# Patient Record
Sex: FEMALE | Race: WHITE | NOT HISPANIC OR LATINO | Employment: PART TIME | URBAN - METROPOLITAN AREA
[De-identification: names, ages, dates, MRNs, and addresses within clinical notes are randomized per-mention and may not be internally consistent; named-entity substitution may affect disease eponyms.]

---

## 2017-02-17 ENCOUNTER — OFFICE VISIT (OUTPATIENT)
Dept: OBGYN | Facility: CLINIC | Age: 62
End: 2017-02-17
Payer: COMMERCIAL

## 2017-02-17 ENCOUNTER — RADIANT APPOINTMENT (OUTPATIENT)
Dept: MAMMOGRAPHY | Facility: CLINIC | Age: 62
End: 2017-02-17
Payer: COMMERCIAL

## 2017-02-17 VITALS
HEIGHT: 61 IN | DIASTOLIC BLOOD PRESSURE: 64 MMHG | WEIGHT: 161 LBS | SYSTOLIC BLOOD PRESSURE: 114 MMHG | BODY MASS INDEX: 30.4 KG/M2

## 2017-02-17 DIAGNOSIS — Z01.419 ENCOUNTER FOR GYNECOLOGICAL EXAMINATION WITHOUT ABNORMAL FINDING: Primary | ICD-10-CM

## 2017-02-17 DIAGNOSIS — Z12.31 VISIT FOR SCREENING MAMMOGRAM: ICD-10-CM

## 2017-02-17 PROCEDURE — 77063 BREAST TOMOSYNTHESIS BI: CPT | Mod: TC

## 2017-02-17 PROCEDURE — G0202 SCR MAMMO BI INCL CAD: HCPCS | Mod: TC

## 2017-02-17 PROCEDURE — 99396 PREV VISIT EST AGE 40-64: CPT | Performed by: OBSTETRICS & GYNECOLOGY

## 2017-02-17 ASSESSMENT — PATIENT HEALTH QUESTIONNAIRE - PHQ9: 5. POOR APPETITE OR OVEREATING: NOT AT ALL

## 2017-02-17 ASSESSMENT — ANXIETY QUESTIONNAIRES
3. WORRYING TOO MUCH ABOUT DIFFERENT THINGS: NOT AT ALL
5. BEING SO RESTLESS THAT IT IS HARD TO SIT STILL: NOT AT ALL
6. BECOMING EASILY ANNOYED OR IRRITABLE: NOT AT ALL
1. FEELING NERVOUS, ANXIOUS, OR ON EDGE: NOT AT ALL
2. NOT BEING ABLE TO STOP OR CONTROL WORRYING: NOT AT ALL
7. FEELING AFRAID AS IF SOMETHING AWFUL MIGHT HAPPEN: NOT AT ALL
IF YOU CHECKED OFF ANY PROBLEMS ON THIS QUESTIONNAIRE, HOW DIFFICULT HAVE THESE PROBLEMS MADE IT FOR YOU TO DO YOUR WORK, TAKE CARE OF THINGS AT HOME, OR GET ALONG WITH OTHER PEOPLE: NOT DIFFICULT AT ALL
GAD7 TOTAL SCORE: 0

## 2017-02-17 NOTE — PROGRESS NOTES
Marissa is a 61 year old  female who presents for annual exam.     Besides routine health maintenance, she has no other health concerns today .    HPI:  The patient's PCP is Mac Gonzalez MD  Still using lubricant for intercourse. Not using vaginal estrogen.  Has occasional USI but not a big problem.        GYNECOLOGIC HISTORY:    Patient's last menstrual period was 2009.  Her current contraception method is: menopause.  She  reports that she has never smoked. She has never used smokeless tobacco.    Patient is sexually active.  STD testing offered?  Declined  Last PHQ-9 score on record =   PHQ-9 SCORE 2017   Total Score 0     Last GAD7 score on record =   CEM-7 SCORE 2017   Total Score 0     Alcohol Score = 0    HEALTH MAINTENANCE:  Cholesterol: (  Cholesterol   Date Value Ref Range Status   2013 228 (H) 0 - 200 mg/dL Final     Comment:     LDL Cholesterol is the primary guide to therapy.   The NCEP recommends further evaluation of: patients with cholesterol greater   than 200 mg/dL if additional risk factors are present, cholesterol greater   than   240 mg/dL, triglycerides greater than 150 mg/dL, or HDL less than 40 mg/dL.   2011 243 (H) 0 - 200 mg/dL Final     Comment:     LDL Cholesterol is the primary guide to therapy.   The NCEP recommends further evaluation of: patients with cholesterol greater   than 200 mg/dL if additional risk factors are present, cholesterol greater   than   240 mg/dL, triglycerides greater than 150 mg/dL, or HDL less than 40 mg/dL.   Last Mammo: one year ago, Result: normal, Next Mammo: today  Pap: 2013 - WNL, -HPV  Colonoscopy:  2008, Result: normal, Next Colonoscopy: 2018  Dexa: Never    Health maintenance updated:  yes    HISTORY:  Obstetric History       T2      TAB1   SAB1   E0   M0   L2       # Outcome Date GA Lbr West/2nd Weight Sex Delivery Anes PTL Lv   4 Term         Y   3 Term         Y   2 SAB            1 TAB        "            Patient Active Problem List   Diagnosis     Sprain of lateral collateral ligament of knee     CARDIOVASCULAR SCREENING; LDL GOAL LESS THAN 160     Esophageal reflux     Dysphagia     Vitamin D deficiency     Vitamin B12 deficiency without anemia     Abnormal glucose     Past Surgical History   Procedure Laterality Date     C  delivery only       C repair cruciate ligament,knee       left knee, by Dr. 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 (2) Maternal Grandmother, Maternal Grandfather    HEART DISEASE (1) Father    Hypertension (2) Mother, Father            Current Outpatient Prescriptions   Medication Sig     Naproxen Sodium (ALEVE PO) Take 220 mg by mouth as needed     No current facility-administered medications for this visit.      No Known Allergies    Past medical, surgical, social and family histories were reviewed and updated in EPIC.    ROS:   12 point review of systems negative other than symptoms noted below.  Head: Nasal Congestion  Gastrointestinal: Heartburn  Genitourinary: No Periods  Musculoskeletal: Joint Pain    EXAM:  /64  Ht 5' 1\" (1.549 m)  Wt 161 lb (73 kg)  LMP 2009  Breastfeeding? No  BMI 30.42 kg/m2   BMI: Body mass index is 30.42 kg/(m^2).    PHYSICAL EXAM:  Constitutional:  Appearance: Well nourished, well developed, alert, in no acute distress  Neck:  Lymph Nodes:  No lymphadenopathy present    Thyroid:  Gland size normal, nontender, no nodules or masses present  on " palpation  Chest:  Respiratory Effort:  Breathing unlabored  Cardiovascular:    Heart: Auscultation:  Regular rate, normal rhythm, no murmurs present  Breasts: Inspection of Breasts:  No lymphadenopathy present    Palpation of Breasts and Axillae:  No masses present on palpation, no  breast tenderness    Axillary Lymph Nodes:  No lymphadenopathy present  Gastrointestinal:   Abdominal Examination:  Abdomen nontender to palpation, tone normal without rigidity or guarding, no masses present, umbilicus without lesions   Liver and Spleen:  No hepatomegaly present, liver nontender to palpation    Hernias:  No hernias present  Lymphatic: Lymph Nodes:  No other lymphadenopathy present  Skin:  General Inspection:  No rashes present, no lesions present, no areas of  discoloration    Genitalia and Groin:  No rashes present, no lesions present, no areas of  discoloration, no masses present  Neurologic/Psychiatric:    Mental Status:  Oriented X3     Pelvic Exam:  External Genitalia:     Normal appearance for age, no discharge present, no tenderness present, no inflammatory lesions present, color normal  Vagina:     Normal vaginal vault without central or paravaginal defects, no discharge present, no inflammatory lesions present, no masses present  Bladder:     Nontender to palpation  Urethra:   Urethral Body:  Urethra palpation normal, urethra structural support normal   Urethral Meatus:  No erythema or lesions present  Cervix:     Appearance healthy, no lesions present, nontender to palpation, no bleeding present  Uterus:     Nontender to palpation, no masses present, position anteflexed, mobility: normal  Adnexa:     No adnexal tenderness present, no adnexal masses present  Perineum:     Perineum within normal limits, no evidence of trauma, no rashes or skin lesions present  Anus:     Anus within normal limits, no hemorrhoids present  Inguinal Lymph Nodes:     No lymphadenopathy present  Pubic Hair:     Normal pubic hair  distribution for age  Genitalia and Groin:     No rashes present, no lesions present, no areas of discoloration, no masses present    COUNSELING:   Reviewed preventive health counseling, as reflected in patient instructions       Regular exercise    BMI: Body mass index is 30.42 kg/(m^2).  Weight management plan: Patient was referred to their PCP to discuss a diet and exercise plan.    ASSESSMENT:  61 year old female with satisfactory annual exam.    ICD-10-CM    1. Encounter for gynecological examination without abnormal finding Z01.419        PLAN:  Mammogram today. RTC 1 year.    Jamari Oh MD

## 2017-02-17 NOTE — MR AVS SNAPSHOT
"              After Visit Summary   2/17/2017    Marissa Kaur    MRN: [id number]           Patient Information     Date Of Birth          1955        Visit Information        Provider Department      2/17/2017 2:30 PM Jamari Oh MD [facility]        Today's Diagnoses     Encounter for gynecological examination without abnormal finding    -  1       Follow-ups after your visit        Who to contact     If you have questions or need follow up information about today's clinic visit or your schedule please contact [facility] directly at [phone].  Normal or non-critical lab and imaging results will be communicated to you by L[employer].frhart, letter or phone within 4 business days after the clinic has received the results. If you do not hear from us within 7 days, please contact the clinic through [employer] or phone. If you have a critical or abnormal lab result, we will notify you by phone as soon as possible.  Submit refill requests through [employer] or call your pharmacy and they will forward the refill request to us. Please allow 3 business days for your refill to be completed.          Additional Information About Your Visit        MyChart Information     [employer] gives you secure access to your electronic health record. If you see a primary care provider, you can also send messages to your care team and make appointments. If you have questions, please call your primary care clinic.  If you do not have a primary care provider, please call [phone] and they will assist you.        Care EveryWhere ID     This is your Care EveryWhere ID. This could be used by other organizations to access your [facility] medical records  [id number]        Your Vitals Were     Height Last Period Breastfeeding? BMI (Body Mass Index)          5' 1\" (1.549 m) 11/02/2009 No 30.42 kg/m2         Blood Pressure from Last 3 Encounters:   02/17/17 114/64   03/21/16 140/78   02/09/16 " 128/80    Weight from Last 3 Encounters:   02/17/17 161 lb (73 kg)   03/21/16 160 lb (72.6 kg)   02/09/16 159 lb (72.1 kg)              Today, you had the following     No orders found for display       Primary Care Provider Office Phone # Fax #    Mac Gonzalez [phone]       [facility] [address] [facility] [address]        Thank you!     Thank you for choosing [facility] [place] [place]  for your care. Our goal is always to provide you with excellent care. Hearing back from our patients is one way we can continue to improve our services. Please take a few minutes to complete the written survey that you may receive in the mail after your visit with us. Thank you!             Your Updated Medication List - Protect others around you: Learn how to safely use, store and throw away your medicines at www.disposemymeds.org.          This list is accurate as of: 2/17/17  4:02 PM.  Always use your most recent med list.                   Brand Name Dispense Instructions for use    ALEVE PO      Take 220 mg by mouth as needed

## 2017-02-18 ASSESSMENT — PATIENT HEALTH QUESTIONNAIRE - PHQ9: SUM OF ALL RESPONSES TO PHQ QUESTIONS 1-9: 0

## 2017-02-18 ASSESSMENT — ANXIETY QUESTIONNAIRES: GAD7 TOTAL SCORE: 0

## 2017-12-21 ENCOUNTER — TELEPHONE (OUTPATIENT)
Dept: INTERNAL MEDICINE | Facility: CLINIC | Age: 62
End: 2017-12-21

## 2018-02-20 ENCOUNTER — OFFICE VISIT (OUTPATIENT)
Dept: OBGYN | Facility: CLINIC | Age: 63
End: 2018-02-20
Attending: OBSTETRICS & GYNECOLOGY
Payer: COMMERCIAL

## 2018-02-20 ENCOUNTER — HOSPITAL ENCOUNTER (OUTPATIENT)
Dept: MAMMOGRAPHY | Facility: CLINIC | Age: 63
Discharge: HOME | End: 2018-02-20
Attending: OBSTETRICS & GYNECOLOGY | Admitting: OBSTETRICS & GYNECOLOGY
Payer: COMMERCIAL

## 2018-02-20 VITALS
HEIGHT: 62 IN | WEIGHT: 169 LBS | DIASTOLIC BLOOD PRESSURE: 76 MMHG | SYSTOLIC BLOOD PRESSURE: 118 MMHG | HEART RATE: 72 BPM | BODY MASS INDEX: 31.1 KG/M2

## 2018-02-20 DIAGNOSIS — Z01.419 ENCOUNTER FOR GYNECOLOGICAL EXAMINATION WITHOUT ABNORMAL FINDING: Primary | ICD-10-CM

## 2018-02-20 DIAGNOSIS — Z12.11 SCREEN FOR COLON CANCER: ICD-10-CM

## 2018-02-20 DIAGNOSIS — E78.00 PURE HYPERCHOLESTEROLEMIA: ICD-10-CM

## 2018-02-20 DIAGNOSIS — Z23 NEED FOR TDAP VACCINATION: ICD-10-CM

## 2018-02-20 DIAGNOSIS — Z13.228 SCREENING FOR METABOLIC DISORDER: ICD-10-CM

## 2018-02-20 DIAGNOSIS — Z12.31 VISIT FOR SCREENING MAMMOGRAM: ICD-10-CM

## 2018-02-20 PROCEDURE — G0145 SCR C/V CYTO,THINLAYER,RESCR: HCPCS | Performed by: OBSTETRICS & GYNECOLOGY

## 2018-02-20 PROCEDURE — 77067 SCR MAMMO BI INCL CAD: CPT

## 2018-02-20 PROCEDURE — 90715 TDAP VACCINE 7 YRS/> IM: CPT | Performed by: OBSTETRICS & GYNECOLOGY

## 2018-02-20 PROCEDURE — 90471 IMMUNIZATION ADMIN: CPT | Performed by: OBSTETRICS & GYNECOLOGY

## 2018-02-20 PROCEDURE — 99396 PREV VISIT EST AGE 40-64: CPT | Performed by: OBSTETRICS & GYNECOLOGY

## 2018-02-20 PROCEDURE — 87624 HPV HI-RISK TYP POOLED RSLT: CPT | Performed by: OBSTETRICS & GYNECOLOGY

## 2018-02-20 ASSESSMENT — ANXIETY QUESTIONNAIRES
2. NOT BEING ABLE TO STOP OR CONTROL WORRYING: NOT AT ALL
7. FEELING AFRAID AS IF SOMETHING AWFUL MIGHT HAPPEN: NOT AT ALL
GAD7 TOTAL SCORE: 0
5. BEING SO RESTLESS THAT IT IS HARD TO SIT STILL: NOT AT ALL
6. BECOMING EASILY ANNOYED OR IRRITABLE: NOT AT ALL
1. FEELING NERVOUS, ANXIOUS, OR ON EDGE: NOT AT ALL
3. WORRYING TOO MUCH ABOUT DIFFERENT THINGS: NOT AT ALL

## 2018-02-20 ASSESSMENT — PATIENT HEALTH QUESTIONNAIRE - PHQ9: 5. POOR APPETITE OR OVEREATING: NOT AT ALL

## 2018-02-20 NOTE — LETTER
February 28, 2018    Marissa Kaur  [address] [address] [facility] [address]    Dear Marissa,  We are happy to inform you that your PAP smear result from 2/20/18 is normal.  We are now able to do a follow up test on PAP smears. The DNA test is for HPV (Human Papilloma Virus). Cervical cancer is closely linked with certain types of HPV. Your result showed no evidence of high risk HPV.  Therefore we recommend you return in 3 years for your next pap smear.  You will still need to return to the clinic every year for an annual exam and other preventive tests.  Please contact the clinic at [phone] with any questions.  Sincerely,    Jamari Oh MD/karen

## 2018-02-20 NOTE — PROGRESS NOTES
Marissa is a 62 year old  female who presents for annual exam.     Besides routine health maintenance, she has no other health concerns today .    HPI:  The patient's PCP is  Mca Gonzalez MD.(Doesn't see often)  No big issues.  Uses lubricant prn.  Bladder good.            GYNECOLOGIC HISTORY:    Patient's last menstrual period was 2009.  Her current contraception method is: menopause.  She  reports that she has never smoked. She has never used smokeless tobacco.    Patient is sexually active.  STD testing offered?  Declined  Last PHQ-9 score on record =   PHQ-9 SCORE 2018   Total Score 0     Last GAD7 score on record =   CEM-7 SCORE 2018   Total Score 0     Alcohol Score = 0    HEALTH MAINTENANCE:  Cholesterol: (  Cholesterol   Date Value Ref Range Status   2013 228 (H) 0 - 200 mg/dL Final     Comment:     LDL Cholesterol is the primary guide to therapy.   The NCEP recommends further evaluation of: patients with cholesterol greater   than 200 mg/dL if additional risk factors are present, cholesterol greater   than   240 mg/dL, triglycerides greater than 150 mg/dL, or HDL less than 40 mg/dL.   2011 243 (H) 0 - 200 mg/dL Final     Comment:     LDL Cholesterol is the primary guide to therapy.   The NCEP recommends further evaluation of: patients with cholesterol greater   than 200 mg/dL if additional risk factors are present, cholesterol greater   than   240 mg/dL, triglycerides greater than 150 mg/dL, or HDL less than 40 mg/dL.     Last Mammo: one year ago, Result: normal, Next Mammo: today   Pap:13 NIL HPV-  Colonoscopy:  Age 52, Result: normal, Next Colonoscopy: needs to schedule  Dexa:  never    Health maintenance updated:  yes    HISTORY:  Obstetric History       T2      L2     SAB1   TAB1   Ectopic0   Multiple0   Live Births2       # Outcome Date GA Lbr West/2nd Weight Sex Delivery Anes PTL Lv   4 Term         CHANEL   3 Term         CHANEL   2 SAB           "  1 TAB                   Patient Active Problem List   Diagnosis     Sprain of lateral collateral ligament of knee     CARDIOVASCULAR SCREENING; LDL GOAL LESS THAN 160     Esophageal reflux     Dysphagia     Vitamin D deficiency     Vitamin B12 deficiency without anemia     Abnormal glucose     Past Surgical History:   Procedure Laterality Date     C  DELIVERY ONLY       C REPAIR CRUCIATE LIGAMENT,KNEE      left knee, by Dr. 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 (2) Maternal Grandmother, Maternal Grandfather    HEART DISEASE (1) Father    Hypertension (2) Mother, Father    Pancreatic Cancer (1) Mother            Current Outpatient Prescriptions   Medication Sig     Naproxen Sodium (ALEVE PO) Take 220 mg by mouth as needed     No current facility-administered medications for this visit.      No Known Allergies    Past medical, surgical, social and family histories were reviewed and updated in EPIC.    ROS:   12 point review of systems negative other than symptoms noted below.  Head: Nasal Congestion  Gastrointestinal: Heartburn  Genitourinary: Vaginal Dryness    EXAM:  /76  Pulse 72  Ht 5' 2\" (1.575 m)  Wt 169 lb (76.7 kg)  LMP 2009  BMI 30.91 kg/m2   BMI: Body mass index is 30.91 kg/(m^2).    PHYSICAL EXAM:  Constitutional:  Appearance: Well nourished, well developed, alert, in no acute distress  Neck:  Lymph Nodes:  No lymphadenopathy present    Thyroid:  Gland size normal, nontender, no nodules or masses " present  on palpation  Chest:  Respiratory Effort:  Breathing unlabored  Cardiovascular:    Heart: Auscultation:  Regular rate, normal rhythm, no murmurs present  Breasts: Inspection of Breasts:  No lymphadenopathy present., Palpation of Breasts and Axillae:  No masses present on palpation, no breast tenderness., Axillary Lymph Nodes:  No lymphadenopathy present. and No nodularity, asymmetry or nipple discharge bilaterally.  Gastrointestinal:   Abdominal Examination:  Abdomen nontender to palpation, tone normal without rigidity or guarding, no masses present, umbilicus without lesions   Liver and Spleen:  No hepatomegaly present, liver nontender to palpation    Hernias:  No hernias present  Lymphatic: Lymph Nodes:  No other lymphadenopathy present  Skin:  General Inspection:  No rashes present, no lesions present, no areas of  discoloration    Genitalia and Groin:  No rashes present, no lesions present, no areas of  discoloration, no masses present  Neurologic/Psychiatric:    Mental Status:  Oriented X3     Pelvic Exam:  External Genitalia:     Normal appearance for age, no discharge present, no tenderness present, no inflammatory lesions present, color normal  Vagina:     Normal vaginal vault without central or paravaginal defects, ATROPHIC  Bladder:     Nontender to palpation  Urethra:   Urethral Body:  Urethra palpation normal, urethra structural support normal   Urethral Meatus:  No erythema or lesions present  Cervix:     Appearance healthy, no lesions present, nontender to palpation, no bleeding present  Uterus:     Nontender to palpation, no masses present, position anteflexed, mobility: normal  Adnexa:     No adnexal tenderness present, no adnexal masses present  Perineum:     Perineum within normal limits, no evidence of trauma, no rashes or skin lesions present  Inguinal Lymph Nodes:     No lymphadenopathy present      COUNSELING:   Reviewed preventive health counseling, as reflected in patient  instructions       Regular exercise    BMI: Body mass index is 30.91 kg/(m^2).  Weight management plan: Encouraged diet and exercise    ASSESSMENT:  62 year old female with satisfactory annual exam.    ICD-10-CM    1. Encounter for gynecological examination without abnormal finding Z01.419 Pap imaged thin layer screen with HPV - recommended age 30 - 65     HPV High Risk Types DNA Cervical   2. Screen for colon cancer Z12.11 GASTROENTEROLOGY ADULT REF PROCEDURE ONLY Jessica Romo [phone]; (has done through [facility] in the past)   3. Pure hypercholesterolemia E78.00 Lipid Profile   4. Screening for metabolic disorder Z13.228 **Comprehensive metabolic panel FUTURE 6mo       PLAN:  Due for Tdap and Flu shot today.  Will RTC at local clinic for fasting labs.      Jamari Oh MD

## 2018-02-20 NOTE — MR AVS SNAPSHOT
After Visit Summary   2/20/2018    Marissa Kaur    MRN: [id number]           Patient Information     Date Of Birth          1955        Visit Information        Provider Department      2/20/2018 2:00 PM Jamari Oh MD [facility]        Today's Diagnoses     Encounter for gynecological examination without abnormal finding    -  1    Screen for colon cancer        Pure hypercholesterolemia        Screening for metabolic disorder           Follow-ups after your visit        Additional Services     GASTROENTEROLOGY ADULT REF PROCEDURE ONLY Jessica Romo [phone]; (has done through [facility] in the past)       Last Lab Result: Creatinine (mg/dL)       Date                     Value                 12/30/2015               0.85             ----------  Body mass index is 30.91 kg/(m^2).     Needed:  No  Language:  English    Patient will be contacted to schedule procedure.     Please be aware that coverage of these services is subject to the terms and limitations of your health insurance plan.  Call member services at your health plan with any benefit or coverage questions.  Any procedures must be performed at a [facility] facility OR coordinated by your clinic's referral office.    Please bring the following with you to your appointment:    (1) Any X-Rays, CTs or MRIs which have been performed.  Contact the facility where they were done to arrange for  prior to your scheduled appointment.    (2) List of current medications   (3) This referral request   (4) Any documents/labs given to you for this referral                  Future tests that were ordered for you today     Open Future Orders        Priority Expected Expires Ordered    **Comprehensive metabolic panel FUTURE 6mo Routine 8/19/2018 9/18/2018 2/20/2018    Lipid Profile Routine  2/20/2019 2/20/2018            Who to contact     If you have questions or need follow up information  "about today's clinic visit or your schedule please contact [facility] directly at [phone].  Normal or non-critical lab and imaging results will be communicated to you by JEDI MINDhart, letter or phone within 4 business days after the clinic has received the results. If you do not hear from us within 7 days, please contact the clinic through [employer] or phone. If you have a critical or abnormal lab result, we will notify you by phone as soon as possible.  Submit refill requests through [employer] or call your pharmacy and they will forward the refill request to us. Please allow 3 business days for your refill to be completed.          Additional Information About Your Visit        [employer] Information     [employer] gives you secure access to your electronic health record. If you see a primary care provider, you can also send messages to your care team and make appointments. If you have questions, please call your primary care clinic.  If you do not have a primary care provider, please call [phone] and they will assist you.        Care EveryWhere ID     This is your Care EveryWhere ID. This could be used by other organizations to access your [facility] medical records  [id number]        Your Vitals Were     Pulse Height Last Period BMI (Body Mass Index)          72 5' 2\" (1.575 m) 11/02/2009 30.91 kg/m2         Blood Pressure from Last 3 Encounters:   02/20/18 118/76   02/17/17 114/64   03/21/16 140/78    Weight from Last 3 Encounters:   02/20/18 169 lb (76.7 kg)   02/17/17 161 lb (73 kg)   03/21/16 160 lb (72.6 kg)              We Performed the Following     GASTROENTEROLOGY ADULT REF PROCEDURE ONLY Jessica Romo [phone]; (has done through [facility] in the past)     HPV High Risk Types DNA Cervical     Pap imaged thin layer screen with HPV - recommended age 30 - 65        Primary Care Provider Office Phone # Fax #    Mac Gonzalez [phone]       XXX RESIGNED " XXX  [facility] [address]        Equal Access to Services     ERIC ARTIS : Hadii aad ku hadjamildebra Rajeevali, wapetrada willyadaha, qaviralta niamaleatha riveramattierupali taylor. So [facility] [phone].    ATENCIÓN: Si habla español, tiene a kulkarni disposición servicios gratuitos de asistencia lingüística. Llame al [phone].    We comply with applicable federal civil rights laws and [place] laws. We do not discriminate on the basis of race, color, national origin, age, disability, sex, sexual orientation, or gender identity.            Thank you!     Thank you for choosing [facility]  for your care. Our goal is always to provide you with excellent care. Hearing back from our patients is one way we can continue to improve our services. Please take a few minutes to complete the written survey that you may receive in the mail after your visit with us. Thank you!             Your Updated Medication List - Protect others around you: Learn how to safely use, store and throw away your medicines at www.disposemymeds.org.          This list is accurate as of 2/20/18  2:38 PM.  Always use your most recent med list.                   Brand Name Dispense Instructions for use Diagnosis    ALEVE PO      Take 220 mg by mouth as needed

## 2018-02-21 ASSESSMENT — ANXIETY QUESTIONNAIRES: GAD7 TOTAL SCORE: 0

## 2018-02-21 ASSESSMENT — PATIENT HEALTH QUESTIONNAIRE - PHQ9: SUM OF ALL RESPONSES TO PHQ QUESTIONS 1-9: 0

## 2018-02-23 LAB
COPATH REPORT: NORMAL
PAP: NORMAL

## 2018-02-27 LAB
FINAL DIAGNOSIS: NORMAL
HPV HR 12 DNA CVX QL NAA+PROBE: NEGATIVE
HPV16 DNA SPEC QL NAA+PROBE: NEGATIVE
HPV18 DNA SPEC QL NAA+PROBE: NEGATIVE
SPECIMEN DESCRIPTION: NORMAL
SPECIMEN SOURCE CVX/VAG CYTO: NORMAL

## 2018-10-10 ENCOUNTER — TELEPHONE (OUTPATIENT)
Dept: OBGYN | Facility: CLINIC | Age: 63
End: 2018-10-10

## 2019-03-01 ENCOUNTER — OFFICE VISIT (OUTPATIENT)
Dept: OBGYN | Facility: CLINIC | Age: 64
End: 2019-03-01
Payer: COMMERCIAL

## 2019-03-01 ENCOUNTER — ANCILLARY PROCEDURE (OUTPATIENT)
Dept: MAMMOGRAPHY | Facility: CLINIC | Age: 64
End: 2019-03-01
Payer: COMMERCIAL

## 2019-03-01 VITALS
HEIGHT: 61 IN | SYSTOLIC BLOOD PRESSURE: 122 MMHG | BODY MASS INDEX: 31.34 KG/M2 | WEIGHT: 166 LBS | DIASTOLIC BLOOD PRESSURE: 70 MMHG

## 2019-03-01 DIAGNOSIS — Z13.220 ENCOUNTER FOR LIPID SCREENING FOR CARDIOVASCULAR DISEASE: ICD-10-CM

## 2019-03-01 DIAGNOSIS — Z13.6 ENCOUNTER FOR LIPID SCREENING FOR CARDIOVASCULAR DISEASE: ICD-10-CM

## 2019-03-01 DIAGNOSIS — Z12.31 VISIT FOR SCREENING MAMMOGRAM: ICD-10-CM

## 2019-03-01 DIAGNOSIS — Z12.11 SCREEN FOR COLON CANCER: ICD-10-CM

## 2019-03-01 DIAGNOSIS — Z01.419 ENCOUNTER FOR GYNECOLOGICAL EXAMINATION WITHOUT ABNORMAL FINDING: Primary | ICD-10-CM

## 2019-03-01 DIAGNOSIS — Z13.228 SCREENING FOR METABOLIC DISORDER: ICD-10-CM

## 2019-03-01 PROCEDURE — 77067 SCR MAMMO BI INCL CAD: CPT | Mod: TC

## 2019-03-01 PROCEDURE — 99396 PREV VISIT EST AGE 40-64: CPT | Performed by: OBSTETRICS & GYNECOLOGY

## 2019-03-01 PROCEDURE — 77063 BREAST TOMOSYNTHESIS BI: CPT | Mod: TC

## 2019-03-01 ASSESSMENT — MIFFLIN-ST. JEOR: SCORE: 1255.35

## 2019-03-01 ASSESSMENT — ANXIETY QUESTIONNAIRES
7. FEELING AFRAID AS IF SOMETHING AWFUL MIGHT HAPPEN: NOT AT ALL
GAD7 TOTAL SCORE: 0
6. BECOMING EASILY ANNOYED OR IRRITABLE: NOT AT ALL
3. WORRYING TOO MUCH ABOUT DIFFERENT THINGS: NOT AT ALL
IF YOU CHECKED OFF ANY PROBLEMS ON THIS QUESTIONNAIRE, HOW DIFFICULT HAVE THESE PROBLEMS MADE IT FOR YOU TO DO YOUR WORK, TAKE CARE OF THINGS AT HOME, OR GET ALONG WITH OTHER PEOPLE: NOT DIFFICULT AT ALL
5. BEING SO RESTLESS THAT IT IS HARD TO SIT STILL: NOT AT ALL
1. FEELING NERVOUS, ANXIOUS, OR ON EDGE: NOT AT ALL
2. NOT BEING ABLE TO STOP OR CONTROL WORRYING: NOT AT ALL

## 2019-03-01 ASSESSMENT — PATIENT HEALTH QUESTIONNAIRE - PHQ9
5. POOR APPETITE OR OVEREATING: NOT AT ALL
SUM OF ALL RESPONSES TO PHQ QUESTIONS 1-9: 0

## 2019-03-01 NOTE — PROGRESS NOTES
Marissa is a 63 year old  female who presents for annual exam.     Besides routine health maintenance, she has no other health concerns today .    HPI:    No concerns. Feels good. Needs fasting labs.   Overdue for colonoscopy  UTD with Tdap      GYNECOLOGIC HISTORY:    Patient's last menstrual period was 2009.  She  reports that  has never smoked. she has never used smokeless tobacco.    Patient is sexually active.  STD testing offered?  Declined     Last PHQ-9 score on record =   PHQ-9 SCORE 3/1/2019   PHQ-9 Total Score 0     Last GAD7 score on record =   CEM-7 SCORE 3/1/2019   Total Score 0     Alcohol Score = 0    HEALTH MAINTENANCE:  Cholesterol: future lab orders placed  13   Total= 228, Triglycerides=62, HDL=71, BKZ=358, FBS=93  Cholesterol   Date Value Ref Range Status   2013 228 (H) 0 - 200 mg/dL Final     Comment:     LDL Cholesterol is the primary guide to therapy.   The NCEP recommends further evaluation of: patients with cholesterol greater   than 200 mg/dL if additional risk factors are present, cholesterol greater   than   240 mg/dL, triglycerides greater than 150 mg/dL, or HDL less than 40 mg/dL.   2011 243 (H) 0 - 200 mg/dL Final     Comment:     LDL Cholesterol is the primary guide to therapy.   The NCEP recommends further evaluation of: patients with cholesterol greater   than 200 mg/dL if additional risk factors are present, cholesterol greater   than   240 mg/dL, triglycerides greater than 150 mg/dL, or HDL less than 40 mg/dL.   Last Mammo: 18, Result: normal, Next Mammo: today   Pap:   Lab Results   Component Value Date    PAP NIL, NEG-HPV 2018   Colonoscopy:  Referred to [facility]  Dexa:  never  Health maintenance updated:  yes    HISTORY:  Obstetric History       T2      L2     SAB1   TAB1   Ectopic0   Multiple0   Live Births2       # Outcome Date GA Lbr West/2nd Weight Sex Delivery Anes PTL Lv   4 Term         CHANEL   3 Term         HCANEL  "  2 SAB            1 TAB                   Patient Active Problem List   Diagnosis     Sprain of lateral collateral ligament of knee     CARDIOVASCULAR SCREENING; LDL GOAL LESS THAN 160     Esophageal reflux     Dysphagia     Vitamin D deficiency     Vitamin B12 deficiency without anemia     Abnormal glucose     Past Surgical History:   Procedure Laterality Date     C  DELIVERY ONLY       C REPAIR CRUCIATE LIGAMENT,KNEE      left knee, by Dr. 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 (2) Maternal Grandmother, Maternal Grandfather    Heart Disease (1) Father    Hypertension (2) Mother, Father    Pancreatic Cancer (1) Mother            Current Outpatient Medications   Medication Sig     Cholecalciferol (VITAMIN D3 PO) Take by mouth daily     Cyanocobalamin (VITAMIN B 12 PO)      Digestive Enzymes (DIGESTIVE ENZYME PO)      Multiple Vitamin (MULTIVITAMINS PO)      Naproxen Sodium (ALEVE PO) Take 220 mg by mouth as needed     Probiotic Product (PROBIOTIC PO)      No current facility-administered medications for this visit.      No Known Allergies    Past medical, surgical, social and family histories were reviewed and updated in EPIC.    ROS:   12 point review of systems negative other than symptoms noted below.  Head: Nasal Congestion    EXAM:  /70   Ht 1.549 m (5' 1\")   Wt 75.3 kg (166 lb)   LMP 2009   BMI 31.37 kg/m     BMI: Body mass index is 31.37 kg/m .    PHYSICAL " EXAM:  Constitutional:  Appearance: Well nourished, well developed, alert, in no acute distress  Neck:  Lymph Nodes:  No lymphadenopathy present    Thyroid:  Gland size normal, nontender, no nodules or masses present  on palpation  Chest:  Respiratory Effort:  Breathing unlabored  Cardiovascular:    Heart: Auscultation:  Regular rate, normal rhythm, no murmurs present  Breasts: Inspection of Breasts:  No lymphadenopathy present., Palpation of Breasts and Axillae:  No masses present on palpation, no breast tenderness., Axillary Lymph Nodes:  No lymphadenopathy present. and No nodularity, asymmetry or nipple discharge bilaterally.  Gastrointestinal:   Abdominal Examination:  Abdomen nontender to palpation, tone normal without rigidity or guarding, no masses present, umbilicus without lesions   Liver and Spleen:  No hepatomegaly present, liver nontender to palpation    Hernias:  No hernias present  Lymphatic: Lymph Nodes:  No other lymphadenopathy present  Skin:  General Inspection:  No rashes present, no lesions present, no areas of  discoloration    Genitalia and Groin:  No rashes present, no lesions present, no areas of  discoloration, no masses present  Neurologic/Psychiatric:    Mental Status:  Oriented X3     Pelvic Exam:  External Genitalia:     Normal appearance for age, no discharge present, no tenderness present, no inflammatory lesions present, color normal  Vagina:     Normal vaginal vault without central or paravaginal defects, no discharge present, no inflammatory lesions present, no masses present  Bladder:     Nontender to palpation  Urethra:   Urethral Body:  Urethra palpation normal, urethra structural support normal   Urethral Meatus:  No erythema or lesions present  Cervix:     Appearance healthy, no lesions present, nontender to palpation, no bleeding present  Uterus:     Uterus: firm, normal sized and nontender, anteverted in position.   Adnexa:     No adnexal tenderness present, no adnexal masses  present  Perineum:     Perineum within normal limits, no evidence of trauma, no rashes or skin lesions present  Anus:     Anus within normal limits, no hemorrhoids present  Inguinal Lymph Nodes:     No lymphadenopathy present  Pubic Hair:     Normal pubic hair distribution for age  Genitalia and Groin:     No rashes present, no lesions present, no areas of discoloration, no masses present      COUNSELING:   Reviewed preventive health counseling, as reflected in patient instructions       Regular exercise    BMI: Body mass index is 31.37 kg/m .  Weight management plan: Encouraged weight loss    ASSESSMENT:  63 year old female with satisfactory annual exam.    ICD-10-CM    1. Encounter for gynecological examination without abnormal finding Z01.419        PLAN:  Will RTC for fasting labs and possibly ShingRix vaccine    Jamari Oh MD

## 2019-03-02 ASSESSMENT — ANXIETY QUESTIONNAIRES: GAD7 TOTAL SCORE: 0

## 2019-05-15 ENCOUNTER — TELEPHONE (OUTPATIENT)
Dept: OBGYN | Facility: CLINIC | Age: 64
End: 2019-05-15

## 2019-05-15 NOTE — TELEPHONE ENCOUNTER
1st attempt. Called patient regarding overdue labs. No answer left vm message for patient to schedule a lab only appt. Labs extended 3 months.

## 2019-06-08 ENCOUNTER — APPOINTMENT (OUTPATIENT)
Dept: CT IMAGING | Facility: CLINIC | Age: 64
End: 2019-06-08
Attending: PHYSICIAN ASSISTANT
Payer: COMMERCIAL

## 2019-06-08 ENCOUNTER — HOSPITAL ENCOUNTER (EMERGENCY)
Facility: CLINIC | Age: 64
Discharge: HOME | End: 2019-06-08
Attending: NURSE PRACTITIONER | Admitting: NURSE PRACTITIONER
Payer: COMMERCIAL

## 2019-06-08 VITALS
OXYGEN SATURATION: 95 % | HEART RATE: 84 BPM | SYSTOLIC BLOOD PRESSURE: 136 MMHG | WEIGHT: 160 LBS | TEMPERATURE: 97.9 F | DIASTOLIC BLOOD PRESSURE: 77 MMHG | BODY MASS INDEX: 30.23 KG/M2 | RESPIRATION RATE: 16 BRPM

## 2019-06-08 DIAGNOSIS — R07.9 ACUTE CHEST PAIN: ICD-10-CM

## 2019-06-08 LAB
ALBUMIN SERPL-MCNC: 3.5 G/DL (ref 3.4–5)
ALP SERPL-CCNC: 159 U/L (ref 40–150)
ALT SERPL W P-5'-P-CCNC: 31 U/L (ref 0–50)
ANION GAP SERPL CALCULATED.3IONS-SCNC: 6 MMOL/L (ref 3–14)
AST SERPL W P-5'-P-CCNC: 30 U/L (ref 0–45)
BASOPHILS # BLD AUTO: 0 10E9/L (ref 0–0.2)
BASOPHILS NFR BLD AUTO: 0.5 %
BILIRUB SERPL-MCNC: 0.2 MG/DL (ref 0.2–1.3)
BUN SERPL-MCNC: 22 MG/DL (ref 7–30)
CALCIUM SERPL-MCNC: 8.8 MG/DL (ref 8.5–10.1)
CHLORIDE SERPL-SCNC: 109 MMOL/L (ref 94–109)
CO2 SERPL-SCNC: 27 MMOL/L (ref 20–32)
CREAT SERPL-MCNC: 0.88 MG/DL (ref 0.52–1.04)
D DIMER PPP FEU-MCNC: 0.7 UG/ML FEU (ref 0–0.5)
DIFFERENTIAL METHOD BLD: NORMAL
EOSINOPHIL # BLD AUTO: 0.1 10E9/L (ref 0–0.7)
EOSINOPHIL NFR BLD AUTO: 1 %
ERYTHROCYTE [DISTWIDTH] IN BLOOD BY AUTOMATED COUNT: 13.2 % (ref 10–15)
GFR SERPL CREATININE-BSD FRML MDRD: 71 ML/MIN/{1.73_M2}
GLUCOSE SERPL-MCNC: 121 MG/DL (ref 70–99)
HCT VFR BLD AUTO: 41.4 % (ref 35–47)
HGB BLD-MCNC: 13.7 G/DL (ref 11.7–15.7)
IMM GRANULOCYTES # BLD: 0 10E9/L (ref 0–0.4)
IMM GRANULOCYTES NFR BLD: 0.4 %
LIPASE SERPL-CCNC: 134 U/L (ref 73–393)
LYMPHOCYTES # BLD AUTO: 2.2 10E9/L (ref 0.8–5.3)
LYMPHOCYTES NFR BLD AUTO: 26 %
MCH RBC QN AUTO: 30.2 PG (ref 26.5–33)
MCHC RBC AUTO-ENTMCNC: 33.1 G/DL (ref 31.5–36.5)
MCV RBC AUTO: 91 FL (ref 78–100)
MONOCYTES # BLD AUTO: 0.7 10E9/L (ref 0–1.3)
MONOCYTES NFR BLD AUTO: 8 %
NEUTROPHILS # BLD AUTO: 5.4 10E9/L (ref 1.6–8.3)
NEUTROPHILS NFR BLD AUTO: 64.1 %
NRBC # BLD AUTO: 0 10*3/UL
NRBC BLD AUTO-RTO: 0 /100
PLATELET # BLD AUTO: 276 10E9/L (ref 150–450)
POTASSIUM SERPL-SCNC: 3.8 MMOL/L (ref 3.4–5.3)
PROT SERPL-MCNC: 7.2 G/DL (ref 6.8–8.8)
RBC # BLD AUTO: 4.54 10E12/L (ref 3.8–5.2)
SODIUM SERPL-SCNC: 142 MMOL/L (ref 133–144)
TROPONIN I SERPL-MCNC: <0.015 UG/L (ref 0–0.04)
TROPONIN I SERPL-MCNC: <0.015 UG/L (ref 0–0.04)
TSH SERPL DL<=0.005 MIU/L-ACNC: 2.1 MU/L (ref 0.4–4)
WBC # BLD AUTO: 8.4 10E9/L (ref 4–11)

## 2019-06-08 PROCEDURE — 25000132 ZZH RX MED GY IP 250 OP 250 PS 637: Performed by: PHYSICIAN ASSISTANT

## 2019-06-08 PROCEDURE — 83690 ASSAY OF LIPASE: CPT | Performed by: PHYSICIAN ASSISTANT

## 2019-06-08 PROCEDURE — 84443 ASSAY THYROID STIM HORMONE: CPT | Performed by: PHYSICIAN ASSISTANT

## 2019-06-08 PROCEDURE — 80053 COMPREHEN METABOLIC PANEL: CPT | Performed by: PHYSICIAN ASSISTANT

## 2019-06-08 PROCEDURE — 99285 EMERGENCY DEPT VISIT HI MDM: CPT | Mod: 25

## 2019-06-08 PROCEDURE — 71260 CT THORAX DX C+: CPT

## 2019-06-08 PROCEDURE — 93005 ELECTROCARDIOGRAM TRACING: CPT

## 2019-06-08 PROCEDURE — 85379 FIBRIN DEGRADATION QUANT: CPT | Performed by: PHYSICIAN ASSISTANT

## 2019-06-08 PROCEDURE — 84484 ASSAY OF TROPONIN QUANT: CPT | Performed by: PHYSICIAN ASSISTANT

## 2019-06-08 PROCEDURE — 85025 COMPLETE CBC W/AUTO DIFF WBC: CPT | Performed by: PHYSICIAN ASSISTANT

## 2019-06-08 PROCEDURE — 25000128 H RX IP 250 OP 636: Performed by: PHYSICIAN ASSISTANT

## 2019-06-08 RX ORDER — ASPIRIN 325 MG
325 TABLET ORAL ONCE
Status: COMPLETED | OUTPATIENT
Start: 2019-06-08 | End: 2019-06-08

## 2019-06-08 RX ORDER — IOPAMIDOL 755 MG/ML
500 INJECTION, SOLUTION INTRAVASCULAR ONCE
Status: COMPLETED | OUTPATIENT
Start: 2019-06-08 | End: 2019-06-08

## 2019-06-08 RX ADMIN — ASPIRIN 325 MG ORAL TABLET 325 MG: 325 PILL ORAL at 18:13

## 2019-06-08 RX ADMIN — SODIUM CHLORIDE 78 ML: 9 INJECTION, SOLUTION INTRAVENOUS at 19:08

## 2019-06-08 RX ADMIN — IOPAMIDOL 63 ML: 755 INJECTION, SOLUTION INTRAVENOUS at 19:08

## 2019-06-08 ASSESSMENT — ENCOUNTER SYMPTOMS
VOMITING: 0
SHORTNESS OF BREATH: 1
NAUSEA: 0
DIAPHORESIS: 1

## 2019-06-08 NOTE — ED AVS SNAPSHOT
[facility] Emergency Department  [address]  [facility] [address]  Phone:  [phone]  Fax:  [phone]                                    Marissa Kaur   MRN: [id number]    Department:  [facility] Emergency Department   Date of Visit:  6/8/2019           After Visit Summary Signature Page    I have received my discharge instructions, and my questions have been answered. I have discussed any challenges I see with this plan with the nurse or doctor.    ..........................................................................................................................................  Patient/Patient Representative Signature      ..........................................................................................................................................  Patient Representative Print Name and Relationship to Patient    ..................................................               ................................................  Date                                   Time    ..........................................................................................................................................  Reviewed by Signature/Title    ...................................................              ..............................................  Date                                               Time          22EPIC Rev 08/18

## 2019-06-08 NOTE — ED TRIAGE NOTES
"Pt presents with midsternal chest pain that starts in center of back, described as \"stabbing pain.\" Pt reports she was doing yard work today and came back inside c/o of back pain, became diaphoretic and dizzy. ABCs intact A&Ox4.  "

## 2019-06-08 NOTE — ED PROVIDER NOTES
History     Chief Complaint:  Chest Pain      HPI   Marissa Kaur is a 63 year old female who presents with her  for evaluation of substernal chest pain and mid-back pain. Patient's pain began 45 minutes prior to arrival while she was sitting on the couch. She had been doing yard work all day and had no pain at that time.  She said the pain was very intense like a sharp stabbing pain and it made her diaphoretic. She was slightly nauseous at the time but was not vomiting. She also mentioned that she was short of breath at the time but is not currently short of breath in the emergency department. She denies any numbness in her fingers or toes. Upon arrival to the emergency department, the patient was laid down flat and had resolution of her pain. The patient does endorse a history of gastroesophageal reflux disease.  She had dry roasted peanuts today, which is unusual for her. She takes probiotics in the morning to manage her stomach discomfort. She does mention that she had an ablation in  for SVT and has had no issues since.    Cardiac/PE/DVT Risk Factors:  History of hypertension - Negative  History of hyperlipidemia - Negative  History of diabetes - Negative  History of smoking - Negative  Personal history of PE/DVT - Negative  Family history of PE/DVT - Negative  Family history of heart complications - History of CHF but no history of ACS  Recent travel - Negative  Recent surgery - Negative  Other immobilizations - Negative  Cancer - Negative    Allergies:  No Known Allergies     Medications:    Vitamins  Probiotics    Past Medical History:    Allergic rhinitis  Fibroid uterus  Menorrhagia  Stress incontinence  SVT    Past Surgical History:    Past Surgical History:   Procedure Laterality Date     C  DELIVERY ONLY       C REPAIR CRUCIATE LIGAMENT,KNEE      left knee, by Dr. 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 Maternal Grandmother      C.A.D. Maternal Grandfather      Hypertension Mother      Pancreatic Cancer Mother      Hypertension Father      Heart Disease Father      Breast Cancer Sister 40       Social History:  Marital Status:   [2]  Social History     Tobacco Use     Smoking status: Never Smoker     Smokeless tobacco: Never Used   Substance Use Topics     Alcohol use: Yes     Drug use: No      Review of Systems   Constitutional: Positive for diaphoresis.   Respiratory: Positive for shortness of breath.    Cardiovascular: Positive for chest pain.   Gastrointestinal: Negative for nausea and vomiting.   All other systems reviewed and are negative.    Physical Exam    Vitals:  Patient Vitals for the past 24 hrs:   BP Temp Temp src Pulse Heart Rate Resp SpO2 Weight   06/08/19 1819 153/86 -- -- -- 78 15 98 % --   06/08/19 1815 140/75 -- -- 82 89 17 97 % --   06/08/19 1748 (!) 162/93 97.9  F (36.6  C) Oral -- 80 18 100 % 72.6 kg (160 lb)     Physical Exam  General: Alert and interactive. Appears well. Cooperative and pleasant.   Eyes: The pupils are equal and round. EOMs intact. No scleral icterus.  ENT: No abnormalities to the external nose or ears. Mucous membranes moist. Posterior oropharynx is non-erythematous.      Neck: Trachea is in the midline. No nuchal rigidity.     CV: Regular rate and rhythm. S1 and S2 normal without murmur, click, gallop or rub. Left and right radial and DP pulses are equal and normal bilaterally.  Resp: Breath sounds are clear bilaterally, without rhonchi, wheezes, rales. Non-labored, no retractions or accessory muscle use.     GI: Abdomen is soft without distension. No tenderness to palpation. No peritoneal signs.    MS:  Moving all extremities well. Good muscle tone.   Skin: Warm and dry. No rash or lesions noted.  Neuro: Alert and oriented x 3. No focal neurologic deficits. Good strength and sensation in upper and lower extremities.    Psych: Awake. Alert.  Normal affect. Appropriate interactions.  Lymph: No anterior or posterior cervical lymphadenopathy noted.    Emergency Department Course   ECG:  Indication: Chest pain  Completed at 1746.  Read at 1748.   Rate 80 bpm. NV interval 186. QRS duration 84. QT/QTc 372/429. P-R-T axes 49 -1 13.  Normal sinus rhythm.   Low voltage QRS.   Interpreted by Dr. Velasquez.     Imaging:  CT Chest Pulmonary Embolism w Contrast   Final Result   IMPRESSION:   No evidence of acute pulmonary embolus.      REBECCA COLLADO MD        Laboratory:  CBC: AWNL. (WBC 8.4, HGB 13.7, )   Dimer: 0.7 (H)  CMP: Glucose 121 (H), Alk phos 159 (H), o/w WNL (Creatinine 0.88)  Lipase: 134  Troponin (Collected 1756): <0.015  TSH with free T4 reflex: 2.10  Troponin (Collected 2012): <0.015    Interventions:  1813  mg PO    Emergency Department Course:  1750 Past medical records, nursing notes, and vitals reviewed.   I performed an exam of the patient as documented above.   The above imaging and labs were obtained. Results above.  Patient continues to be chest pain free in the department.  Delta troponin recorded negative.   I discussed the treatment plan with the patient. She expressed understanding of this plan and consented to discharge. Patient will be discharged home with instructions for care and follow up. In addition, the patient will return to the emergency department if symptoms persist, worsen, if new symptoms arise or if there is any concern.  All questions were answered.      Impression & Plan    Medical Decision Making:  Marissa Kaur is a 63 year old female with a history of GERD who presents for evaluation of chest pain. The workup in the Emergency Department is negative and reassuring. The  differential is broad, including ACS, PE, cardiac tamponade, aortic dissection, pneumonia, pneumothorax, pericarditis, esophageal rupture, and others.     EKG shows NSR without signs of ischemia and infarction and two troponins are negative, and thus I doubt ACS. Dimer slightly elevated, but CT PE obtained and is negative for PE. Bilateral UE BP obtained, which are unremarkable, and in combination with normal CTPE study, I doubt dissection. Additionally, the patient has no signs of tachypnea, tachycardia, or hypoxia. No signs of leukocytosis, anemia, renal dysfunction, electrolyte abnormalities. I wonder if part of this was GERD related or due to esophageal spasm, as it happened very soon after drinking Diet Coke and eating peanuts.     The patient has a HEART score of 2, and I believe she is stable for outpatient follow up. Stress ECHO order was placed, and patient will return for this in the near future. Otherwise, close follow up with PCP indicated. Return here for worsening chest pain, shortness of breath, vomiting, fevers, or other concerns.     Diagnosis:    ICD-10-CM    1. Acute chest pain R07.9 Echo Stress Echocardiogram       Disposition:  Discharged to home    Madeline Rai PA-C  6/8/2019   [facility] EMERGENCY DEPARTMENT       Madeline Rai PA-C  06/09/19 1227

## 2019-06-09 LAB — INTERPRETATION ECG - MUSE: NORMAL

## 2019-06-26 ENCOUNTER — HOSPITAL ENCOUNTER (OUTPATIENT)
Dept: CARDIOLOGY | Facility: CLINIC | Age: 64
Discharge: HOME | End: 2019-06-26
Attending: PHYSICIAN ASSISTANT | Admitting: PHYSICIAN ASSISTANT
Payer: COMMERCIAL

## 2019-06-26 DIAGNOSIS — R07.9 ACUTE CHEST PAIN: ICD-10-CM

## 2019-06-26 PROCEDURE — 93325 DOPPLER ECHO COLOR FLOW MAPG: CPT | Mod: 26 | Performed by: INTERNAL MEDICINE

## 2019-06-26 PROCEDURE — 93016 CV STRESS TEST SUPVJ ONLY: CPT | Performed by: INTERNAL MEDICINE

## 2019-06-26 PROCEDURE — 93321 DOPPLER ECHO F-UP/LMTD STD: CPT | Mod: 26 | Performed by: INTERNAL MEDICINE

## 2019-06-26 PROCEDURE — 93350 STRESS TTE ONLY: CPT | Mod: 26 | Performed by: INTERNAL MEDICINE

## 2019-06-26 PROCEDURE — 93018 CV STRESS TEST I&R ONLY: CPT | Performed by: INTERNAL MEDICINE

## 2019-06-26 PROCEDURE — 93350 STRESS TTE ONLY: CPT | Mod: TC

## 2019-08-20 NOTE — TELEPHONE ENCOUNTER
2nd attempt. Consent to communicate PHI. Called patient regarding overdue labs. Spoke with patient and she said she will have labs done at a [facility] close to her home. Labs extended 3 months.

## 2019-10-02 ENCOUNTER — HEALTH MAINTENANCE LETTER (OUTPATIENT)
Age: 64
End: 2019-10-02

## 2020-02-24 NOTE — PROGRESS NOTES
Marissa is a 64 year old  female who presents for annual exam.     Besides routine health maintenance, she has no other health concerns today .    HPI:  The patient doesn't have a PCP.   No GYN issues.   Hasn't done second colonoscopy. Really doesn't want to.   hasn't done his first.         GYNECOLOGIC HISTORY:    Patient's last menstrual period was 2009.    Regular menses? No, is in menopause    Her current contraception method is: vasectomy and menopause.  She  reports that she has never smoked. She has never used smokeless tobacco.    Patient is sexually active.  STD testing offered?  Declined  Last PHQ-9 score on record =   PHQ-9 SCORE 3/6/2020   PHQ-9 Total Score 0     Last GAD7 score on record =   CEM-7 SCORE 3/6/2020   Total Score 0     Alcohol Score = 0    HEALTH MAINTENANCE:  Cholesterol:    Recent Labs   Lab Test 10/16/14  1540 13  1338   CHOL  --  228*   HDL  --  71   * 145*   TRIG  --  62   CHOLHDLRATIO  --  3.2     TSH   Date Value Ref Range Status   2019 2.10 0.40 - 4.00 mU/L Final     FBS: 19 121  Last Mammo: 3/1/19, Result: Normal, Next Mammo: Today  Pap:   Lab Results   Component Value Date    PAP NIL, HPV- 2018     Colonoscopy:  Age 52, Result: Normal, Next Colonoscopy: Overdue  Dexa:  NA    Health maintenance updated:  no, due for colonoscopy    HISTORY:  OB History    Para Term  AB Living   4 2 2 0 2 2   SAB TAB Ectopic Multiple Live Births   1 1 0 0 2      # Outcome Date GA Lbr West/2nd Weight Sex Delivery Anes PTL Lv   4 Term         CHANEL   3 Term         CHANEL   2 SAB            1 TAB                Patient Active Problem List   Diagnosis     Sprain of lateral collateral ligament of knee     CARDIOVASCULAR SCREENING; LDL GOAL LESS THAN 160     Esophageal reflux     Dysphagia     Vitamin D deficiency     Vitamin B12 deficiency without anemia     Abnormal glucose     Past Surgical History:   Procedure Laterality Date     C   "DELIVERY ONLY  1985     C REPAIR CRUCIATE LIGAMENT,KNEE  1996    left knee, by Dr. 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 (2) Maternal Grandmother, Maternal Grandfather    Heart Disease (1) Father    Hypertension (2) Mother, Father    No Known Problems (6) Paternal Grandmother, Paternal Grandfather, Brother, Son, Daughter, Other    Pancreatic Cancer (1) Mother            Current Outpatient Medications   Medication Sig     Cholecalciferol (VITAMIN D3 PO) Take by mouth daily     Cyanocobalamin (VITAMIN B 12 PO)      Digestive Enzymes (DIGESTIVE ENZYME PO)      Probiotic Product (PROBIOTIC PO)      Multiple Vitamin (MULTIVITAMINS PO)      No current facility-administered medications for this visit.      No Known Allergies    Past medical, surgical, social and family histories were reviewed and updated in EPIC.    ROS:   12 point review of systems negative other than symptoms noted below or in the HPI.      EXAM:  /78   Pulse 74   Ht 1.556 m (5' 1.25\")   Wt 78.2 kg (172 lb 6.4 oz)   LMP 11/02/2009   BMI 32.31 kg/m     BMI: Body mass index is 32.31 kg/m .    PHYSICAL EXAM:  Constitutional:   Appearance: Well nourished, well developed, alert, in no acute distress  Neck:  Lymph Nodes:  No lymphadenopathy present    Thyroid:  Gland size normal, nontender, no nodules or masses present  on palpation  Chest:  Respiratory Effort:  Breathing unlabored  Cardiovascular:    Heart: Auscultation:  Regular rate, normal " rhythm, no murmurs present  Breasts: Inspection of Breasts:  No lymphadenopathy present., Palpation of Breasts and Axillae:  No masses present on palpation, no breast tenderness., Axillary Lymph Nodes:  No lymphadenopathy present. and No nodularity, asymmetry or nipple discharge bilaterally.  Gastrointestinal:   Abdominal Examination:  Abdomen nontender to palpation, tone normal without rigidity or guarding, no masses present, umbilicus without lesions   Liver and Spleen:  No hepatomegaly present, liver nontender to palpation    Hernias:  No hernias present  Lymphatic: Lymph Nodes:  No other lymphadenopathy present  Skin:  General Inspection:  No rashes present, no lesions present, no areas of  discoloration  Neurologic:    Mental Status:  Oriented X3.  Normal strength and tone, sensory exam                grossly normal, mentation intact and speech normal.    Psychiatric:   Mentation appears normal and affect normal/bright.         Pelvic Exam:  External Genitalia:     Normal appearance for age, no discharge present, no tenderness present, no inflammatory lesions present, color normal  Vagina:     Normal vaginal vault without central or paravaginal defects, no discharge present, no inflammatory lesions present, no masses present  Bladder:     Nontender to palpation  Urethra:   Urethral Body:  Urethra palpation normal, urethra structural support normal   Urethral Meatus:  No erythema or lesions present  Cervix:     Appearance healthy, no lesions present, nontender to palpation, no bleeding present  Uterus:     Uterus: firm, normal sized and nontender, anteverted in position.   Adnexa:     No adnexal tenderness present, no adnexal masses present  Perineum:     Perineum within normal limits, no evidence of trauma, no rashes or skin lesions present  Anus:     Anus within normal limits, no hemorrhoids present  Inguinal Lymph Nodes:     No lymphadenopathy present  Pubic Hair:     Normal pubic hair distribution for  age  Genitalia and Groin:     No rashes present, no lesions present, no areas of discoloration, no masses present      COUNSELING:   Reviewed preventive health counseling, as reflected in patient instructions       Regular exercise    BMI: Body mass index is 32.31 kg/m .  Weight management plan: Encouraged weight loss    ASSESSMENT:  64 year old female with satisfactory annual exam.    ICD-10-CM    1. Encounter for gynecological examination without abnormal finding Z01.419    2. Colon cancer screening Z12.11        PLAN:  Discussed Cologuard in detail. Pt will order it.    Jamari Oh MD

## 2020-03-06 ENCOUNTER — OFFICE VISIT (OUTPATIENT)
Dept: OBGYN | Facility: CLINIC | Age: 65
End: 2020-03-06
Attending: OBSTETRICS & GYNECOLOGY
Payer: COMMERCIAL

## 2020-03-06 ENCOUNTER — ANCILLARY PROCEDURE (OUTPATIENT)
Dept: MAMMOGRAPHY | Facility: CLINIC | Age: 65
End: 2020-03-06
Attending: OBSTETRICS & GYNECOLOGY
Payer: COMMERCIAL

## 2020-03-06 VITALS
SYSTOLIC BLOOD PRESSURE: 118 MMHG | HEIGHT: 61 IN | HEART RATE: 74 BPM | BODY MASS INDEX: 32.55 KG/M2 | WEIGHT: 172.4 LBS | DIASTOLIC BLOOD PRESSURE: 78 MMHG

## 2020-03-06 DIAGNOSIS — Z01.419 ENCOUNTER FOR GYNECOLOGICAL EXAMINATION WITHOUT ABNORMAL FINDING: Primary | ICD-10-CM

## 2020-03-06 DIAGNOSIS — Z12.31 VISIT FOR SCREENING MAMMOGRAM: ICD-10-CM

## 2020-03-06 DIAGNOSIS — Z12.11 COLON CANCER SCREENING: ICD-10-CM

## 2020-03-06 PROCEDURE — 77067 SCR MAMMO BI INCL CAD: CPT | Mod: TC

## 2020-03-06 PROCEDURE — 99396 PREV VISIT EST AGE 40-64: CPT | Performed by: OBSTETRICS & GYNECOLOGY

## 2020-03-06 PROCEDURE — 77063 BREAST TOMOSYNTHESIS BI: CPT | Mod: TC

## 2020-03-06 ASSESSMENT — ANXIETY QUESTIONNAIRES
3. WORRYING TOO MUCH ABOUT DIFFERENT THINGS: NOT AT ALL
7. FEELING AFRAID AS IF SOMETHING AWFUL MIGHT HAPPEN: NOT AT ALL
1. FEELING NERVOUS, ANXIOUS, OR ON EDGE: NOT AT ALL
6. BECOMING EASILY ANNOYED OR IRRITABLE: NOT AT ALL
GAD7 TOTAL SCORE: 0
IF YOU CHECKED OFF ANY PROBLEMS ON THIS QUESTIONNAIRE, HOW DIFFICULT HAVE THESE PROBLEMS MADE IT FOR YOU TO DO YOUR WORK, TAKE CARE OF THINGS AT HOME, OR GET ALONG WITH OTHER PEOPLE: NOT DIFFICULT AT ALL
2. NOT BEING ABLE TO STOP OR CONTROL WORRYING: NOT AT ALL
5. BEING SO RESTLESS THAT IT IS HARD TO SIT STILL: NOT AT ALL

## 2020-03-06 ASSESSMENT — MIFFLIN-ST. JEOR: SCORE: 1283.34

## 2020-03-06 ASSESSMENT — PATIENT HEALTH QUESTIONNAIRE - PHQ9
SUM OF ALL RESPONSES TO PHQ QUESTIONS 1-9: 0
5. POOR APPETITE OR OVEREATING: NOT AT ALL

## 2020-03-07 ASSESSMENT — ANXIETY QUESTIONNAIRES: GAD7 TOTAL SCORE: 0

## 2020-06-18 ENCOUNTER — HOSPITAL ENCOUNTER (EMERGENCY)
Facility: CLINIC | Age: 65
Discharge: HOME | End: 2020-06-18
Attending: EMERGENCY MEDICINE | Admitting: EMERGENCY MEDICINE
Payer: COMMERCIAL

## 2020-06-18 VITALS
HEART RATE: 75 BPM | DIASTOLIC BLOOD PRESSURE: 76 MMHG | RESPIRATION RATE: 16 BRPM | SYSTOLIC BLOOD PRESSURE: 151 MMHG | OXYGEN SATURATION: 97 % | TEMPERATURE: 98.1 F

## 2020-06-18 DIAGNOSIS — F13.930 BENZODIAZEPINE WITHDRAWAL WITHOUT COMPLICATION (H): ICD-10-CM

## 2020-06-18 LAB
ANION GAP SERPL CALCULATED.3IONS-SCNC: 4 MMOL/L (ref 3–14)
BASOPHILS # BLD AUTO: 0 10E9/L (ref 0–0.2)
BASOPHILS NFR BLD AUTO: 0.2 %
BUN SERPL-MCNC: 8 MG/DL (ref 7–30)
CALCIUM SERPL-MCNC: 9 MG/DL (ref 8.5–10.1)
CHLORIDE SERPL-SCNC: 106 MMOL/L (ref 94–109)
CO2 SERPL-SCNC: 28 MMOL/L (ref 20–32)
CREAT SERPL-MCNC: 0.71 MG/DL (ref 0.52–1.04)
DIFFERENTIAL METHOD BLD: NORMAL
EOSINOPHIL # BLD AUTO: 0 10E9/L (ref 0–0.7)
EOSINOPHIL NFR BLD AUTO: 0.1 %
ERYTHROCYTE [DISTWIDTH] IN BLOOD BY AUTOMATED COUNT: 13.3 % (ref 10–15)
GFR SERPL CREATININE-BSD FRML MDRD: >90 ML/MIN/{1.73_M2}
GLUCOSE SERPL-MCNC: 152 MG/DL (ref 70–99)
HCT VFR BLD AUTO: 43 % (ref 35–47)
HGB BLD-MCNC: 14.2 G/DL (ref 11.7–15.7)
IMM GRANULOCYTES # BLD: 0 10E9/L (ref 0–0.4)
IMM GRANULOCYTES NFR BLD: 0.4 %
LYMPHOCYTES # BLD AUTO: 1.3 10E9/L (ref 0.8–5.3)
LYMPHOCYTES NFR BLD AUTO: 13.1 %
MCH RBC QN AUTO: 29.9 PG (ref 26.5–33)
MCHC RBC AUTO-ENTMCNC: 33 G/DL (ref 31.5–36.5)
MCV RBC AUTO: 91 FL (ref 78–100)
MONOCYTES # BLD AUTO: 0.5 10E9/L (ref 0–1.3)
MONOCYTES NFR BLD AUTO: 4.7 %
NEUTROPHILS # BLD AUTO: 7.8 10E9/L (ref 1.6–8.3)
NEUTROPHILS NFR BLD AUTO: 81.5 %
NRBC # BLD AUTO: 0 10*3/UL
NRBC BLD AUTO-RTO: 0 /100
PLATELET # BLD AUTO: 306 10E9/L (ref 150–450)
POTASSIUM SERPL-SCNC: 3.2 MMOL/L (ref 3.4–5.3)
RBC # BLD AUTO: 4.75 10E12/L (ref 3.8–5.2)
SODIUM SERPL-SCNC: 138 MMOL/L (ref 133–144)
WBC # BLD AUTO: 9.6 10E9/L (ref 4–11)

## 2020-06-18 PROCEDURE — 25000128 H RX IP 250 OP 636

## 2020-06-18 PROCEDURE — 25000132 ZZH RX MED GY IP 250 OP 250 PS 637: Performed by: EMERGENCY MEDICINE

## 2020-06-18 PROCEDURE — 25800030 ZZH RX IP 258 OP 636: Performed by: EMERGENCY MEDICINE

## 2020-06-18 PROCEDURE — 80048 BASIC METABOLIC PNL TOTAL CA: CPT | Performed by: EMERGENCY MEDICINE

## 2020-06-18 PROCEDURE — 96374 THER/PROPH/DIAG INJ IV PUSH: CPT

## 2020-06-18 PROCEDURE — 96361 HYDRATE IV INFUSION ADD-ON: CPT

## 2020-06-18 PROCEDURE — 85025 COMPLETE CBC W/AUTO DIFF WBC: CPT | Performed by: EMERGENCY MEDICINE

## 2020-06-18 PROCEDURE — 99284 EMERGENCY DEPT VISIT MOD MDM: CPT | Mod: 25

## 2020-06-18 RX ORDER — HYDROXYZINE HYDROCHLORIDE 25 MG/1
25 TABLET, FILM COATED ORAL 3 TIMES DAILY PRN
Qty: 20 TABLET | Refills: 0 | Status: SHIPPED | OUTPATIENT
Start: 2020-06-18 | End: 2022-10-15

## 2020-06-18 RX ORDER — ONDANSETRON 4 MG/1
4 TABLET, ORALLY DISINTEGRATING ORAL EVERY 8 HOURS PRN
Qty: 10 TABLET | Refills: 0 | Status: SHIPPED | OUTPATIENT
Start: 2020-06-18 | End: 2020-06-21

## 2020-06-18 RX ORDER — ONDANSETRON 2 MG/ML
INJECTION INTRAMUSCULAR; INTRAVENOUS
Status: COMPLETED
Start: 2020-06-18 | End: 2020-06-18

## 2020-06-18 RX ORDER — TRAZODONE HYDROCHLORIDE 100 MG/1
100 TABLET ORAL AT BEDTIME
Qty: 20 TABLET | Refills: 0 | Status: SHIPPED | OUTPATIENT
Start: 2020-06-18 | End: 2022-10-15

## 2020-06-18 RX ORDER — SODIUM CHLORIDE 9 MG/ML
1000 INJECTION, SOLUTION INTRAVENOUS CONTINUOUS
Status: DISCONTINUED | OUTPATIENT
Start: 2020-06-18 | End: 2020-06-18 | Stop reason: HOSPADM

## 2020-06-18 RX ORDER — POTASSIUM CHLORIDE 1500 MG/1
20 TABLET, EXTENDED RELEASE ORAL ONCE
Status: COMPLETED | OUTPATIENT
Start: 2020-06-18 | End: 2020-06-18

## 2020-06-18 RX ORDER — ONDANSETRON 2 MG/ML
4 INJECTION INTRAMUSCULAR; INTRAVENOUS ONCE
Status: COMPLETED | OUTPATIENT
Start: 2020-06-18 | End: 2020-06-18

## 2020-06-18 RX ORDER — LOPERAMIDE HYDROCHLORIDE 2 MG/1
2 TABLET ORAL 4 TIMES DAILY PRN
Qty: 20 TABLET | Refills: 0 | Status: SHIPPED | OUTPATIENT
Start: 2020-06-18 | End: 2022-10-15

## 2020-06-18 RX ORDER — POTASSIUM CHLORIDE 1500 MG/1
20 TABLET, EXTENDED RELEASE ORAL 2 TIMES DAILY
Qty: 10 TABLET | Refills: 1 | Status: SHIPPED | OUTPATIENT
Start: 2020-06-18 | End: 2022-10-15

## 2020-06-18 RX ADMIN — ONDANSETRON 4 MG: 2 INJECTION INTRAMUSCULAR; INTRAVENOUS at 08:16

## 2020-06-18 RX ADMIN — POTASSIUM CHLORIDE 20 MEQ: 1500 TABLET, EXTENDED RELEASE ORAL at 08:56

## 2020-06-18 RX ADMIN — SODIUM CHLORIDE 1000 ML: 9 INJECTION, SOLUTION INTRAVENOUS at 08:00

## 2020-06-18 RX ADMIN — SODIUM CHLORIDE 500 ML: 9 INJECTION, SOLUTION INTRAVENOUS at 08:55

## 2020-06-18 ASSESSMENT — ENCOUNTER SYMPTOMS
FEVER: 0
TREMORS: 1
SLEEP DISTURBANCE: 1
DIARRHEA: 1
MYALGIAS: 1
NAUSEA: 1
CHILLS: 1

## 2020-06-18 NOTE — ED AVS SNAPSHOT
[facility] Emergency Department  [address]  [facility] [address]  Phone:  [phone]  Fax:  [phone]                                    Marissa Kaur   MRN: [id number]    Department:  [facility] Emergency Department   Date of Visit:  6/18/2020           After Visit Summary Signature Page    I have received my discharge instructions, and my questions have been answered. I have discussed any challenges I see with this plan with the nurse or doctor.    ..........................................................................................................................................  Patient/Patient Representative Signature      ..........................................................................................................................................  Patient Representative Print Name and Relationship to Patient    ..................................................               ................................................  Date                                   Time    ..........................................................................................................................................  Reviewed by Signature/Title    ...................................................              ..............................................  Date                                               Time          22EPIC Rev 08/18

## 2020-06-18 NOTE — DISCHARGE INSTRUCTIONS
Your symptoms of insomnia nausea palpitations increased anxiety after stopping Klonopin is common.  We have discussed your care with the inpatient chemical dependency team and they recommend no admission at this time due to the fact that you have only been using this for a month.  Use Zofran for nausea.  Use trazodone for sleep.  Use Imodium for diarrhea.  Use hydroxyzine for anxiety.  The only other option to help with withdrawal is to restart benzodiazepines but we hope that over the course of the next few days to a week you will improve.  If you have palpitations that do not resolve return to the emergency room.  If you have diarrhea that is ongoing please follow-up with your regular doctor for further assistance.

## 2020-06-18 NOTE — ED TRIAGE NOTES
Patient presents with complaints of concerns for withdrawal of Clonazepam. Patient states she had been taking it for three weeks and stopped 4 days ago. Patient is now having nausea, diarrhea, fever, chills, shaking, and muscle pain . ABC intact without need for intervention at this time.

## 2020-06-18 NOTE — ED PROVIDER NOTES
History     Chief Complaint:  Withdrawal       HPI   Marissa Kaur is a 64 year old female with a history of SVT s/p ablation and esophageal reflux who presents with concerns for Clonazepam withdrawal.     Patient presents with muscle aches difficulty sleeping diarrhea and not feeling well after stopping Klonopin.    Patient was prescribed 60 tablets of half a milligram Klonopin tablets through her primary care's office a month ago for anxiety since she has been transition to Zoloft and has been taking this on a regular basis.  Her daughter who works in mental health found out she was on Klonopin and was recommended to discontinue it.  Patient stopped this 4 days ago.  Since then she is developed diarrhea and has not felt well she has had difficulty sleeping presents the emergency room due to her own concerns that she is in Klonopin withdrawal.      Allergies:  No Known Drug Allergies     Medications:    The patient is currently on no regular medications. Was previously on Clonazepam.     Past Medical History:    Allergic rhinitis  Esophageal reflux   Vitamin D deficiency   Vitamin B12 deficiency without anemia   Fibroid uterus   Menorrhagia   Stress incontinence  SVT     Past Surgical History:    C section delivery   Left knee repair  Colonoscopy   Excise soft tissue tumor thigh   Ablation SVT   Tonsillectomy     Family History:    Mother - hypertension, pancreatic cancer   Father - hypertension, heart disease  Sister - breast cancer     Social History:  The patient was alone.  Smoking Status: Never  Smokeless Tobacco: Never  Alcohol Use: Not currently   Marital Status:        Review of Systems   Constitutional: Positive for chills. Negative for fever.   Gastrointestinal: Positive for diarrhea and nausea.   Musculoskeletal: Positive for myalgias.   Neurological: Positive for tremors.   Psychiatric/Behavioral: Positive for sleep disturbance.   All other systems reviewed and are  negative.        Physical Exam     Patient Vitals for the past 24 hrs:   BP Temp Temp src Pulse Resp SpO2   06/18/20 0729 (!) 169/100 98.1  F (36.7  C) Oral 100 18 98 %       Physical Exam  HENT:      Head: Normocephalic.      Mouth/Throat:      Mouth: Mucous membranes are moist.   Cardiovascular:      Rate and Rhythm: Normal rate and regular rhythm.   Pulmonary:      Effort: Pulmonary effort is normal.   Abdominal:      General: Abdomen is flat.      Palpations: Abdomen is soft.   Skin:     Capillary Refill: Capillary refill takes less than 2 seconds.   Neurological:      General: No focal deficit present.      Mental Status: She is alert.   Psychiatric:         Mood and Affect: Mood normal.      Comments: Anxious states taking clonazepam for the last 3 to 4 weeks.  No suicidal or homicidal ideation           Emergency Department Course     Laboratory:  Laboratory findings were communicated with the patient who voiced understanding of the findings.    CBC: AWNL. (WBC 9.6, HGB 14.2, )   BMP: Potassium 3.2 (L), Glucose 152 (H) o/w WNL (Creatinine 0.71)      Interventions:  0800 NS Bolus 1,000mL IV   0816 Zofran 4mg IV   0855 NS Bolus 500mL IV   0856 Potassium chloride ER 20 mEq PO     Emergency Department Course:  Past medical records, nursing notes, and vitals reviewed.    0745 I performed an exam of the patient as documented above.     IV was inserted and blood was drawn for laboratory testing, results above.    I rechecked the patient and discussed the results of her workup thus far.     Findings and plan explained to the Patient. Patient discharged home with instructions regarding supportive care, medications, and reasons to return. The importance of close follow-up was reviewed. The patient was prescribed Atarax, Imodium, Zofran, Potassium chloride ER, Trazodone.     I personally reviewed the laboratory results with the Patient and answered all related questions prior to discharge.     Impression & Plan      Medical Decision Making:  Patient presents with insomnia anxiety muscle aches and concerns for benzodiazepine withdrawal.  Clinically I am suspicious of mild benzo withdrawal as patient been taking clonazepam for 3 weeks and suddenly stopped 4 days ago.  Patient is well-appearing without concerns of altered mental status.  Her lab work is unremarkable.  Clinically suspect mild benzodiazepine withdrawal and did discuss inpatient chemical dependency treatment with the central intake phone number they did advise that usually the chemical dependency people recommend least 2 months of addiction before inpatient treatment.  I think this is reasonable patient has very mild symptoms of withdrawal I did consider restarting the patient on benzodiazepines but do not recommend this.  We will attempt to continue symptomatic management using Zofran trazodone at night for sleep hydroxyzine as needed anxiety and help that her symptoms will resolve.  Patient encouraged to follow-up with primary care and avoid benzodiazepines at all costs.    Diagnosis:    ICD-10-CM    1. Benzodiazepine withdrawal without complication (H)  F13.230        Disposition:  Discharged to home.    Discharge Medications:  New Prescriptions    HYDROXYZINE (ATARAX) 25 MG TABLET    Take 1 tablet (25 mg) by mouth 3 times daily as needed for anxiety    LOPERAMIDE (IMODIUM A-D) 2 MG TABLET    Take 1 tablet (2 mg) by mouth 4 times daily as needed for diarrhea    ONDANSETRON (ZOFRAN ODT) 4 MG ODT TAB    Take 1 tablet (4 mg) by mouth every 8 hours as needed    POTASSIUM CHLORIDE ER (KLOR-CON M) 20 MEQ CR TABLET    Take 1 tablet (20 mEq) by mouth 2 times daily    TRAZODONE (DESYREL) 100 MG TABLET    Take 1 tablet (100 mg) by mouth At Bedtime       Scribe Disclosure:  Laney HUTCHISON, am serving as a scribe at 7:36 AM on 6/18/2020 to document services personally performed by Cole Gaytan MD based on my observations and the provider's statements to me.    6/18/2020   [facility] EMERGENCY DEPARTMENT       Cole Gaytan MD  06/18/20 1111

## 2020-09-02 ENCOUNTER — PRE VISIT (OUTPATIENT)
Dept: CARDIOLOGY | Facility: CLINIC | Age: 65
End: 2020-09-02

## 2020-09-04 ENCOUNTER — PRE VISIT (OUTPATIENT)
Dept: CARDIOLOGY | Facility: CLINIC | Age: 65
End: 2020-09-04

## 2020-09-09 ENCOUNTER — OFFICE VISIT (OUTPATIENT)
Dept: CARDIOLOGY | Facility: CLINIC | Age: 65
End: 2020-09-09
Payer: COMMERCIAL

## 2020-09-09 VITALS
DIASTOLIC BLOOD PRESSURE: 84 MMHG | HEART RATE: 82 BPM | SYSTOLIC BLOOD PRESSURE: 164 MMHG | HEIGHT: 61 IN | WEIGHT: 160 LBS | BODY MASS INDEX: 30.21 KG/M2

## 2020-09-09 DIAGNOSIS — Z13.6 SCREENING FOR CARDIOVASCULAR CONDITION: Primary | ICD-10-CM

## 2020-09-09 PROCEDURE — 93000 ELECTROCARDIOGRAM COMPLETE: CPT | Performed by: INTERNAL MEDICINE

## 2020-09-09 PROCEDURE — 99202 OFFICE O/P NEW SF 15 MIN: CPT | Performed by: INTERNAL MEDICINE

## 2020-09-09 ASSESSMENT — MIFFLIN-ST. JEOR: SCORE: 1218.14

## 2020-09-09 NOTE — LETTER
9/9/2020    David A Gray, MD [facility] [place] [address]  [place] [address]    RE: Marissa Kuar       Dear Colleague,    I had the pleasure of seeing Marissa Kaur in the [facility].    HPI and Plan:   See dictation    Orders Placed This Encounter   Procedures     EKG 12-lead complete w/read - Clinics (performed today)       Orders Placed This Encounter   Medications     sertraline (ZOLOFT) 100 MG tablet     Sig: Take 50 mg by mouth       There are no discontinued medications.      Encounter Diagnosis   Name Primary?     Screening for cardiovascular condition Yes       CURRENT MEDICATIONS:  Current Outpatient Medications   Medication Sig Dispense Refill     sertraline (ZOLOFT) 100 MG tablet Take 50 mg by mouth       Cholecalciferol (VITAMIN D3 PO) Take by mouth daily       Cyanocobalamin (VITAMIN B 12 PO)        Digestive Enzymes (DIGESTIVE ENZYME PO)        hydrOXYzine (ATARAX) 25 MG tablet Take 1 tablet (25 mg) by mouth 3 times daily as needed for anxiety (Patient not taking: Reported on 9/9/2020) 20 tablet 0     loperamide (IMODIUM A-D) 2 MG tablet Take 1 tablet (2 mg) by mouth 4 times daily as needed for diarrhea (Patient not taking: Reported on 9/9/2020) 20 tablet 0     Multiple Vitamin (MULTIVITAMINS PO)        potassium chloride ER (KLOR-CON M) 20 MEQ CR tablet Take 1 tablet (20 mEq) by mouth 2 times daily (Patient not taking: Reported on 9/9/2020) 10 tablet 1     Probiotic Product (PROBIOTIC PO)        traZODone (DESYREL) 100 MG tablet Take 1 tablet (100 mg) by mouth At Bedtime (Patient not taking: Reported on 9/9/2020) 20 tablet 0       ALLERGIES   No Known Allergies    PAST MEDICAL HISTORY:  Past Medical History:   Diagnosis Date     Allergic rhinitis due to other allergen     seasonal     Depression      Fibroid uterus      Menorrhagia 2007     Stress incontinence, female      SVT (supraventricular tachycardia) (H)     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 Loomis     COLONOSCOPY       EXCISE SOFT TISSUE TUMOR THIGH  2013    Procedure: EXCISE SOFT TISSUE TUMOR THIGH;  Removal Of Left distal Thigh Tumor;  Surgeon: Zan Lewis MD;  Location: [facility]     H ABLATION SVT  10/2010    AVNRT     KNEE SURGERY       TONSILLECTOMY         FAMILY HISTORY:  Family History   Problem Relation Age of Onset     C.A.D. Maternal Grandmother      C.A.D. Maternal Grandfather      Hypertension Mother      Pancreatic Cancer Mother      Hypertension Father      Heart Disease Father      No Known Problems Paternal Grandmother      No Known Problems Paternal Grandfather      No Known Problems Brother      Breast Cancer Sister 40     No Known Problems Son      No Known Problems Daughter      No Known Problems Other        SOCIAL HISTORY:  Social History     Socioeconomic History     Marital status:      Spouse name: None     Number of children: 2     Years of education: None     Highest education level: None   Occupational History     None   Social Needs     Financial resource strain: None     Food insecurity     Worry: None     Inability: None     Transportation needs     Medical: None     Non-medical: None   Tobacco Use     Smoking status: Never Smoker     Smokeless tobacco: Never Used   Substance and Sexual Activity     Alcohol use: Not Currently     Drug use: No     Sexual activity: Yes     Partners: Male     Birth control/protection: Post-menopausal, Male Surgical     Comment: vasectomy   Lifestyle     Physical activity     Days per week: None     Minutes per session: None     Stress: None   Relationships     Social connections     Talks on phone: None     Gets together: None     Attends Worship service: None     Active member of club or organization: None     Attends meetings  "of clubs or organizations: None     Relationship status: None     Intimate partner violence     Fear of current or ex partner: None     Emotionally abused: None     Physically abused: None     Forced sexual activity: None   Other Topics Concern     Parent/sibling w/ CABG, MI or angioplasty before 65F 55M? No      Service Not Asked     Blood Transfusions Not Asked     Caffeine Concern Yes     Comment: decafe     Occupational Exposure Not Asked     Hobby Hazards Not Asked     Sleep Concern No     Stress Concern No     Weight Concern Not Asked     Special Diet Not Asked     Back Care Not Asked     Exercise No     Bike Helmet Not Asked     Seat Belt Not Asked     Self-Exams Not Asked   Social History Narrative     None       Review of Systems:  Skin:  Negative       Eyes:  Positive for glasses    ENT:  Negative      Respiratory:  Negative       Cardiovascular:    palpitations;Positive for    Gastroenterology: Negative      Genitourinary:  Negative      Musculoskeletal:  Positive for joint pain    Neurologic:  Negative      Psychiatric:  Negative      Heme/Lymph/Imm:  Negative      Endocrine:  Negative        Physical Exam:  Vitals: BP (!) 164/84 (BP Location: Right arm, Patient Position: Sitting, Cuff Size: Adult Regular)   Pulse 82   Ht 1.549 m (5' 1\")   Wt 72.6 kg (160 lb)   LMP 11/02/2009   Breastfeeding No   BMI 30.23 kg/m      Constitutional:           Skin:             Head:           Eyes:           Lymph:      ENT:           Neck:           Respiratory:            Cardiac:                                                           GI:           Extremities and Muscular Skeletal:                 Neurological:           Psych:           CC  No referring provider defined for this encounter.                Thank you for allowing me to participate in the care of your patient.      Sincerely,     Irene Loepz MD     [facility]    cc:   No referring provider defined for this " encounter.

## 2020-09-09 NOTE — PROGRESS NOTES
Service Date: 09/09/2020      HISTORY OF PRESENT ILLNESS:  I saw Ms. Kaur for reevaluation of palpitations.  She is a 65-year-old white female with apparent anxiety.  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  She had another visit with me for recurrent palpitations on 03/21/2016.  Based on her report at that time, I recommended observation only.  At that time she was going through bankruptcy and was under significant distress.      This year, she has been somewhat stressed by the COVID pandemic.  In addition, she had some issues with switching to clonazepam and is currently back to trazodone.  Symptomatically, she is having intermittent anxiety.  She has an occasional uneasy feeling for less than a second.  She is very worried about her heart condition and would like to come back to see me for further discussion.  She has no chest pain, shortness of breath or other severe symptoms.      PHYSICAL EXAMINATION:  Blood pressure 164/84, heart rate 82 beats per minute, body weight 160 pounds.  Cardiovascular examination showed no remarkable abnormalities.      IMAGING:  EKG showed normal sinus rhythm at 94 beats per minute.      ASSESSMENT AND RECOMMENDATIONS:  Ms. Kaur is doing well from the cardiovascular point of view.  Again, I advised her to monitor her pulses for regularity and rate if she ever becomes symptomatic.  She has no evidence of recurrent SVT, and I do not recommend further investigation.  She seemed to be somewhat relieved after the clinic visit.  Her blood pressure is elevated, but she believes that is due to anxiety.  For that reason, I did not add high blood pressure medication.  She may need future followup of her blood pressure in your office.      cc:   Tarik Parson MD   [facility] [place] [facility] [address] [place] [address]         HARRISON LANG MD             D: 09/09/2020   T: 09/09/2020   MT: sadiq      Name:     KADEN VINCENT    MRN:      [id number]        Account:      [id number]   :      1955           Service Date: 2020      Document: [id number]

## 2020-09-09 NOTE — LETTER
9/9/2020      David A Gray, MD [facility] [place] [address]  [place] [address]      RE: Marissa Kaur       Dear Colleague,    I had the pleasure of seeing Marissa Kaur in the [facility].    Service Date: 09/09/2020      HISTORY OF PRESENT ILLNESS:  I saw Ms. Kaur for reevaluation of palpitations.  She is a 65-year-old white female with apparent anxiety.  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  She had another visit with me for recurrent palpitations on 03/21/2016.  Based on her report at that time, I recommended observation only.  At that time she was going through bankruptcy and was under significant distress.      This year, she has been somewhat stressed by the COVID pandemic.  In addition, she had some issues with switching to clonazepam and is currently back to trazodone.  Symptomatically, she is having intermittent anxiety.  She has an occasional uneasy feeling for less than a second.  She is very worried about her heart condition and would like to come back to see me for further discussion.  She has no chest pain, shortness of breath or other severe symptoms.      PHYSICAL EXAMINATION:  Blood pressure 164/84, heart rate 82 beats per minute, body weight 160 pounds.  Cardiovascular examination showed no remarkable abnormalities.      IMAGING:  EKG showed normal sinus rhythm at 94 beats per minute.      ASSESSMENT AND RECOMMENDATIONS:  Ms. Kaur is doing well from the cardiovascular point of view.  Again, I advised her to monitor her pulses for regularity and rate if she ever becomes symptomatic.  She has no evidence of recurrent SVT, and I do not recommend further investigation.  She seemed to be somewhat relieved after the clinic visit.  Her blood pressure is elevated, but she believes that is due to anxiety.  For that reason, I did not add high blood pressure medication.  She may need future  followup of her blood pressure in your office.      cc:   Tarik Parson MD   [facility] [place] [facility] [address] [place] [address]         HARRISON LOPEZ MD             D: 2020   T: 2020   MT: sadiq      Name:     VINCENT ALFONSO   MRN:      [id number]        Account:      [id number]   :      1955           Service Date: 2020      Document: [id number]           Outpatient Encounter Medications as of 2020   Medication Sig Dispense Refill     sertraline (ZOLOFT) 100 MG tablet Take 50 mg by mouth       Cholecalciferol (VITAMIN D3 PO) Take by mouth daily       Cyanocobalamin (VITAMIN B 12 PO)        Digestive Enzymes (DIGESTIVE ENZYME PO)        hydrOXYzine (ATARAX) 25 MG tablet Take 1 tablet (25 mg) by mouth 3 times daily as needed for anxiety (Patient not taking: Reported on 2020) 20 tablet 0     loperamide (IMODIUM A-D) 2 MG tablet Take 1 tablet (2 mg) by mouth 4 times daily as needed for diarrhea (Patient not taking: Reported on 2020) 20 tablet 0     Multiple Vitamin (MULTIVITAMINS PO)        potassium chloride ER (KLOR-CON M) 20 MEQ CR tablet Take 1 tablet (20 mEq) by mouth 2 times daily (Patient not taking: Reported on 2020) 10 tablet 1     Probiotic Product (PROBIOTIC PO)        traZODone (DESYREL) 100 MG tablet Take 1 tablet (100 mg) by mouth At Bedtime (Patient not taking: Reported on 2020) 20 tablet 0     No facility-administered encounter medications on file as of 2020.        Again, thank you for allowing me to participate in the care of your patient.      Sincerely,    Harrison Lopez MD     [facility]

## 2020-09-09 NOTE — PROGRESS NOTES
HPI and Plan:   See dictation    Orders Placed This Encounter   Procedures     EKG 12-lead complete w/read - Clinics (performed today)       Orders Placed This Encounter   Medications     sertraline (ZOLOFT) 100 MG tablet     Sig: Take 50 mg by mouth       There are no discontinued medications.      Encounter Diagnosis   Name Primary?     Screening for cardiovascular condition Yes       CURRENT MEDICATIONS:  Current Outpatient Medications   Medication Sig Dispense Refill     sertraline (ZOLOFT) 100 MG tablet Take 50 mg by mouth       Cholecalciferol (VITAMIN D3 PO) Take by mouth daily       Cyanocobalamin (VITAMIN B 12 PO)        Digestive Enzymes (DIGESTIVE ENZYME PO)        hydrOXYzine (ATARAX) 25 MG tablet Take 1 tablet (25 mg) by mouth 3 times daily as needed for anxiety (Patient not taking: Reported on 2020) 20 tablet 0     loperamide (IMODIUM A-D) 2 MG tablet Take 1 tablet (2 mg) by mouth 4 times daily as needed for diarrhea (Patient not taking: Reported on 2020) 20 tablet 0     Multiple Vitamin (MULTIVITAMINS PO)        potassium chloride ER (KLOR-CON M) 20 MEQ CR tablet Take 1 tablet (20 mEq) by mouth 2 times daily (Patient not taking: Reported on 2020) 10 tablet 1     Probiotic Product (PROBIOTIC PO)        traZODone (DESYREL) 100 MG tablet Take 1 tablet (100 mg) by mouth At Bedtime (Patient not taking: Reported on 2020) 20 tablet 0       ALLERGIES   No Known Allergies    PAST MEDICAL HISTORY:  Past Medical History:   Diagnosis Date     Allergic rhinitis due to other allergen     seasonal     Depression      Fibroid uterus      Menorrhagia 2007     Stress incontinence, female      SVT (supraventricular tachycardia) (H)     AVNRT ablation 10/29/2010       PAST SURGICAL HISTORY:  Past Surgical History:   Procedure Laterality Date     C  DELIVERY ONLY       C REPAIR CRUCIATE LIGAMENT,KNEE      left knee, by Dr. Loomis     COLONOSCOPY       EXCISE SOFT TISSUE TUMOR THIGH   11/21/2013    Procedure: EXCISE SOFT TISSUE TUMOR THIGH;  Removal Of Left distal Thigh Tumor;  Surgeon: Zan Lewis MD;  Location: [facility]     H ABLATION SVT  10/2010    AVNRT     KNEE SURGERY       TONSILLECTOMY  1962       FAMILY HISTORY:  Family History   Problem Relation Age of Onset     C.A.D. Maternal Grandmother      C.A.D. Maternal Grandfather      Hypertension Mother      Pancreatic Cancer Mother      Hypertension Father      Heart Disease Father      No Known Problems Paternal Grandmother      No Known Problems Paternal Grandfather      No Known Problems Brother      Breast Cancer Sister 40     No Known Problems Son      No Known Problems Daughter      No Known Problems Other        SOCIAL HISTORY:  Social History     Socioeconomic History     Marital status:      Spouse name: None     Number of children: 2     Years of education: None     Highest education level: None   Occupational History     None   Social Needs     Financial resource strain: None     Food insecurity     Worry: None     Inability: None     Transportation needs     Medical: None     Non-medical: None   Tobacco Use     Smoking status: Never Smoker     Smokeless tobacco: Never Used   Substance and Sexual Activity     Alcohol use: Not Currently     Drug use: No     Sexual activity: Yes     Partners: Male     Birth control/protection: Post-menopausal, Male Surgical     Comment: vasectomy   Lifestyle     Physical activity     Days per week: None     Minutes per session: None     Stress: None   Relationships     Social connections     Talks on phone: None     Gets together: None     Attends Christianity service: None     Active member of club or organization: None     Attends meetings of clubs or organizations: None     Relationship status: None     Intimate partner violence     Fear of current or ex partner: None     Emotionally abused: None     Physically abused: None     Forced sexual activity: None   Other Topics Concern      "Parent/sibling w/ CABG, MI or angioplasty before 65F 55M? No      Service Not Asked     Blood Transfusions Not Asked     Caffeine Concern Yes     Comment: decafe     Occupational Exposure Not Asked     Hobby Hazards Not Asked     Sleep Concern No     Stress Concern No     Weight Concern Not Asked     Special Diet Not Asked     Back Care Not Asked     Exercise No     Bike Helmet Not Asked     Seat Belt Not Asked     Self-Exams Not Asked   Social History Narrative     None       Review of Systems:  Skin:  Negative       Eyes:  Positive for glasses    ENT:  Negative      Respiratory:  Negative       Cardiovascular:    palpitations;Positive for    Gastroenterology: Negative      Genitourinary:  Negative      Musculoskeletal:  Positive for joint pain    Neurologic:  Negative      Psychiatric:  Negative      Heme/Lymph/Imm:  Negative      Endocrine:  Negative        Physical Exam:  Vitals: BP (!) 164/84 (BP Location: Right arm, Patient Position: Sitting, Cuff Size: Adult Regular)   Pulse 82   Ht 1.549 m (5' 1\")   Wt 72.6 kg (160 lb)   LMP 11/02/2009   Breastfeeding No   BMI 30.23 kg/m      Constitutional:           Skin:             Head:           Eyes:           Lymph:      ENT:           Neck:           Respiratory:            Cardiac:                                                           GI:           Extremities and Muscular Skeletal:                 Neurological:           Psych:           CC  No referring provider defined for this encounter.              "

## 2020-12-28 ENCOUNTER — APPOINTMENT (OUTPATIENT)
Dept: GENERAL RADIOLOGY | Facility: CLINIC | Age: 65
End: 2020-12-28
Attending: EMERGENCY MEDICINE
Payer: COMMERCIAL

## 2020-12-28 ENCOUNTER — HOSPITAL ENCOUNTER (EMERGENCY)
Facility: CLINIC | Age: 65
Discharge: HOME | End: 2020-12-29
Attending: EMERGENCY MEDICINE | Admitting: EMERGENCY MEDICINE
Payer: COMMERCIAL

## 2020-12-28 DIAGNOSIS — R00.2 PALPITATIONS: ICD-10-CM

## 2020-12-28 LAB
ANION GAP SERPL CALCULATED.3IONS-SCNC: 9 MMOL/L (ref 3–14)
BASOPHILS # BLD AUTO: 0 10E9/L (ref 0–0.2)
BASOPHILS NFR BLD AUTO: 0.1 %
BUN SERPL-MCNC: 17 MG/DL (ref 7–30)
CALCIUM SERPL-MCNC: 9.2 MG/DL (ref 8.5–10.1)
CHLORIDE SERPL-SCNC: 105 MMOL/L (ref 94–109)
CO2 SERPL-SCNC: 25 MMOL/L (ref 20–32)
CREAT SERPL-MCNC: 0.81 MG/DL (ref 0.52–1.04)
DIFFERENTIAL METHOD BLD: ABNORMAL
EOSINOPHIL # BLD AUTO: 0 10E9/L (ref 0–0.7)
EOSINOPHIL NFR BLD AUTO: 0 %
ERYTHROCYTE [DISTWIDTH] IN BLOOD BY AUTOMATED COUNT: 13.4 % (ref 10–15)
GFR SERPL CREATININE-BSD FRML MDRD: 77 ML/MIN/{1.73_M2}
GLUCOSE SERPL-MCNC: 167 MG/DL (ref 70–99)
HCT VFR BLD AUTO: 43.4 % (ref 35–47)
HGB BLD-MCNC: 13.9 G/DL (ref 11.7–15.7)
IMM GRANULOCYTES # BLD: 0.1 10E9/L (ref 0–0.4)
IMM GRANULOCYTES NFR BLD: 0.7 %
LYMPHOCYTES # BLD AUTO: 0.8 10E9/L (ref 0.8–5.3)
LYMPHOCYTES NFR BLD AUTO: 6.3 %
MCH RBC QN AUTO: 29 PG (ref 26.5–33)
MCHC RBC AUTO-ENTMCNC: 32 G/DL (ref 31.5–36.5)
MCV RBC AUTO: 90 FL (ref 78–100)
MONOCYTES # BLD AUTO: 0.2 10E9/L (ref 0–1.3)
MONOCYTES NFR BLD AUTO: 1.2 %
NEUTROPHILS # BLD AUTO: 12.1 10E9/L (ref 1.6–8.3)
NEUTROPHILS NFR BLD AUTO: 91.7 %
NRBC # BLD AUTO: 0 10*3/UL
NRBC BLD AUTO-RTO: 0 /100
PLATELET # BLD AUTO: 278 10E9/L (ref 150–450)
POTASSIUM SERPL-SCNC: 3.9 MMOL/L (ref 3.4–5.3)
RBC # BLD AUTO: 4.8 10E12/L (ref 3.8–5.2)
SODIUM SERPL-SCNC: 139 MMOL/L (ref 133–144)
TROPONIN I SERPL-MCNC: <0.015 UG/L (ref 0–0.04)
WBC # BLD AUTO: 13.2 10E9/L (ref 4–11)

## 2020-12-28 PROCEDURE — 80048 BASIC METABOLIC PNL TOTAL CA: CPT | Performed by: EMERGENCY MEDICINE

## 2020-12-28 PROCEDURE — 99285 EMERGENCY DEPT VISIT HI MDM: CPT | Mod: 25

## 2020-12-28 PROCEDURE — 71046 X-RAY EXAM CHEST 2 VIEWS: CPT

## 2020-12-28 PROCEDURE — 84484 ASSAY OF TROPONIN QUANT: CPT | Mod: 91 | Performed by: EMERGENCY MEDICINE

## 2020-12-28 PROCEDURE — 93005 ELECTROCARDIOGRAM TRACING: CPT

## 2020-12-28 PROCEDURE — 85025 COMPLETE CBC W/AUTO DIFF WBC: CPT | Performed by: EMERGENCY MEDICINE

## 2020-12-28 PROCEDURE — 84484 ASSAY OF TROPONIN QUANT: CPT | Performed by: EMERGENCY MEDICINE

## 2020-12-28 PROCEDURE — 93005 ELECTROCARDIOGRAM TRACING: CPT | Mod: 76

## 2020-12-28 ASSESSMENT — ENCOUNTER SYMPTOMS
SHORTNESS OF BREATH: 0
PALPITATIONS: 1

## 2020-12-28 NOTE — ED AVS SNAPSHOT
[facility] Emergency Dept  [address]  [facility] [address]  Phone: [phone]  Fax: [phone]                                    Marissa Kaur   MRN: [id number]    Department: [facility] Emergency Dept   Date of Visit: 12/28/2020           After Visit Summary Signature Page    I have received my discharge instructions, and my questions have been answered. I have discussed any challenges I see with this plan with the nurse or doctor.    ..........................................................................................................................................  Patient/Patient Representative Signature      ..........................................................................................................................................  Patient Representative Print Name and Relationship to Patient    ..................................................               ................................................  Date                                   Time    ..........................................................................................................................................  Reviewed by Signature/Title    ...................................................              ..............................................  Date                                               Time          22EPIC Rev 08/18

## 2020-12-29 VITALS
OXYGEN SATURATION: 95 % | SYSTOLIC BLOOD PRESSURE: 138 MMHG | HEART RATE: 108 BPM | RESPIRATION RATE: 16 BRPM | TEMPERATURE: 98.2 F | DIASTOLIC BLOOD PRESSURE: 75 MMHG

## 2020-12-29 LAB
INTERPRETATION ECG - MUSE: NORMAL
INTERPRETATION ECG - MUSE: NORMAL
TROPONIN I SERPL-MCNC: <0.015 UG/L (ref 0–0.04)

## 2020-12-29 NOTE — ED PROVIDER NOTES
History     Chief Complaint:  Palpitations    HPI   Marissa Kaur is a 65 year old female with a history of anxiety and SVT s/p ablation in 2010 who presents with palpitations. Patient reports that she developed palpitations approximately 4-5 hours ago that she initially contributed to eating more sugar recently. She is currently feeling improved. Patient follows with Dr. Lopez of cardiology and last saw him in Sept for anxiety symptoms. No chest pain, shortness of breath or leg swelling.     Allergies:  No known drug allergies.      Medications:    Desyrel   Atarax   Zoloft     Past Medical History:    Anxiety  Depression   Esophageal reflux   Fibroid uterus   Menorrhagia   Stress incontinence   SVT (supraventricular tachycardia)      Past Surgical History:    Repair cruciate ligament, left knee   Excise soft tissue tumor, left thigh   Ablation SVT   Tonsillectomy     Family History:    Mother: hypertension, pancreatic cancer  Father: hypertension, heart disease   Sister: breast cancer     Social History:  Patient presented alone. PCP: Tarik Parson     Review of Systems   Respiratory: Negative for shortness of breath.    Cardiovascular: Positive for palpitations. Negative for chest pain and leg swelling.   All other systems reviewed and are negative.    Physical Exam     Patient Vitals for the past 24 hrs:   BP Temp Temp src Pulse Resp SpO2   12/29/20 0015 138/75 -- -- 108 -- 95 %   12/29/20 0000 136/72 -- -- 80 -- 97 %   12/28/20 2345 (!) 145/84 -- -- 90 -- 97 %   12/28/20 2330 (!) 143/79 -- -- 91 -- 95 %   12/28/20 2318 -- -- -- 92 -- 99 %   12/28/20 2317 (!) 152/97 -- -- 103 -- --   12/28/20 2224 (!) 200/106 98.2  F (36.8  C) Oral 116 16 98 %     Physical Exam  Constitutional: Patient is well appearing. No distress. Slightly anxious.  Head: Atraumatic.  Mouth/Throat: No thyroid swelling.  Eyes: Conjunctivae and EOM are normal. No scleral icterus.  Neck: Normal range of motion. Neck supple.   Cardiovascular:  Normal rate, regular rhythm, normal heart sounds and intact distal pulses.   Pulmonary/Chest: Breath sounds normal. No respiratory distress.  Abdominal: Soft. Bowel sounds are normal. No distension. No tenderness. No rebound or guarding.   Musculoskeletal: Normal range of motion. No edema or tenderness.   Neurological: Alert and orientated to person, place, and time. No observable focal neuro deficit  Skin: Warm and dry. No rash noted. Not diaphoretic.     Emergency Department Course     ECG:  ECG #1 taken at 2233  Sinus rhythm with premature atrial complexes   Low voltage QRS   Cannot rule out inferior infarct, age undetermined   Abnormal ECG  Rate 99 bpm. VT interval 152 ms. QRS duration 82 ms. QT/QTc 352/451 ms. P-R-T axes 52 -3 5.    ECG #2 taken at 2358, ECG read at 0000  Sinus rhythm with premature supraventricular complexes   Otherwise normal ECG  Rate 80 bpm. VT interval 168 ms. QRS duration 86 ms. QT/QTc 374/431 ms. P-R-T axes 56 10 19.    Imaging:  XR Chest 2 Views  Negative chest.  Reading per radiology     Laboratory:  CBC: WBC 13.2(H), HGB 13.9,   BMP: Glucose 167(H) o/w WNL (Creatinine 0.81)  Troponin (Collected 2244): <0.015    Troponin (Collected 2346): <0.015      Emergency Department Course:  Reviewed:  I reviewed the patient's nursing notes, vitals, past medical records and Care Everywhere.     Assessments:  (2246) I performed an exam of the patient as documented above.         Disposition:  The patient was discharged to home.     Impression & Plan     Medical Decision Making:  Marissa Kaur is a 65 year old female who presents with no CP, no SOB, no abdominal pain.  She has feeling of palpitations for last 5-6 hours with neg trop x2 and normal ekg x2.  CXR and other labwork normal as well.  Has seen Dr. Lopez in recent past for similar symptoms and possibly ablation repeat.  Today sinus and benign borderline tachycardia.  Pt is reassured and wants to go home.      Diagnosis:    ICD-10-CM     1. Palpitations  R00.2        Discharge Medications:  New Prescriptions    No medications on file     Scribe Disclosure:  I, Gardenia Vasquez, am serving as a scribe at 10:42 PM on 12/28/2020 to document services personally performed by Hill Wright MD based on my observations and the provider's statements to me.       [facility] EMERGENCY DEPT       Hill Wright MD  12/29/20 0023

## 2021-05-15 ENCOUNTER — HEALTH MAINTENANCE LETTER (OUTPATIENT)
Age: 66
End: 2021-05-15

## 2021-09-04 ENCOUNTER — HEALTH MAINTENANCE LETTER (OUTPATIENT)
Age: 66
End: 2021-09-04

## 2022-01-01 NOTE — TELEPHONE ENCOUNTER
"12/21/2017        Patient Communication Preferences indicate  Do not contact  and/or communication by \"Phone\" is not preferred. Call not required per Outreach team.          Outreach ,  Caroline Cancino     " Orders  Waleska Marie    1) Discontinue current lantus orders  2) Lantus 15 Units subcutaneous at bedtime. Diagnosis: type 2 DM  3) Hgb, BMP 11/18. Diagnosis: anemia, CKD    GASTON Muhammad CNP on 11/17/2020 at 11:19 AM     Spontaneous

## 2022-06-11 ENCOUNTER — HEALTH MAINTENANCE LETTER (OUTPATIENT)
Age: 67
End: 2022-06-11

## 2022-10-11 ENCOUNTER — TRANSFERRED RECORDS (OUTPATIENT)
Dept: HEALTH INFORMATION MANAGEMENT | Facility: CLINIC | Age: 67
End: 2022-10-11

## 2022-10-11 LAB
CREATININE (EXTERNAL): 0.9 MG/DL (ref 0.55–1.02)
GFR ESTIMATED (EXTERNAL): >60 ML/MIN/1.73M2

## 2022-10-13 NOTE — PROGRESS NOTES
Pre-op Total Joint Patient Screening    1. Do you have a ride available to come to the hospital the day after your surgery by 8am with anticipated discharge of 11am? Y   2. What is the name of this person? Guillermo (spouse)  3. Do you have a  set up after surgery? Y  4. Will your  be the same person that gives you a ride home after surgery? Y  5. Have you received the Joint Replacement Guidebook? Y  6. Do you have any questions about your guidebook? N  7. Have you activated your [employer] account? Y  8. Have you signed up for MY Chart access? Y

## 2022-10-15 RX ORDER — ATORVASTATIN CALCIUM 20 MG/1
20 TABLET, FILM COATED ORAL DAILY
COMMUNITY

## 2022-10-17 RX ORDER — GUAIFENESIN 600 MG/1
600 TABLET, EXTENDED RELEASE ORAL 2 TIMES DAILY
COMMUNITY

## 2022-10-17 NOTE — PROGRESS NOTES
PTA medications updated by Medication Scribe prior to surgery via phone call with patient (last doses completed by Nurse)     Medication history sources: Patient and H&P  In the past week, patient estimated taking medication this percent of the time: Greater than 90%  Adherence assessment: N/A Not Observed    Significant changes made to the medication list:  Patient reports no longer taking the following meds (med scribe removed from PTA med list): Potassium Chloride, Trazodone, Probiotic, Hydroxyzine, Loperamide, Digestive Enzyme      Additional medication history information:   None    Medication reconciliation completed by provider prior to medication history? No    Time spent in this activity: 35 minutes    The information provided in this note is only as accurate as the sources available at the time of update(s)    Prior to Admission medications    Medication Sig Last Dose Taking? Auth Provider Long Term End Date   atorvastatin (LIPITOR) 20 MG tablet Take 20 mg by mouth daily Haven't started yet Yes Reported, Patient Yes    Cholecalciferol (VITAMIN D3) 25 MCG (1000 UT) CAPS Take 1 capsule by mouth daily 10/17/2022 at am Yes Reported, Patient     guaiFENesin (MUCINEX) 600 MG 12 hr tablet Take 600 mg by mouth 2 times daily 10/16/2022 at pm Yes Reported, Patient     omeprazole (PRILOSEC) 20 MG DR capsule Take 20 mg by mouth daily 10/17/2022 at am Yes Reported, Patient     sertraline (ZOLOFT) 50 MG tablet Take 50 mg by mouth daily  at am Yes Reported, Patient Yes      Medication history completed by:    Joaquin Anderson CPhT  Medication Scribe  [facility]

## 2022-10-18 ENCOUNTER — HOSPITAL ENCOUNTER (OUTPATIENT)
Facility: CLINIC | Age: 67
Discharge: HOME | End: 2022-10-19
Attending: ORTHOPAEDIC SURGERY | Admitting: ORTHOPAEDIC SURGERY
Payer: COMMERCIAL

## 2022-10-18 ENCOUNTER — ANESTHESIA (OUTPATIENT)
Dept: SURGERY | Facility: CLINIC | Age: 67
End: 2022-10-18
Payer: COMMERCIAL

## 2022-10-18 ENCOUNTER — APPOINTMENT (OUTPATIENT)
Dept: GENERAL RADIOLOGY | Facility: CLINIC | Age: 67
End: 2022-10-18
Attending: PHYSICIAN ASSISTANT
Payer: COMMERCIAL

## 2022-10-18 ENCOUNTER — APPOINTMENT (OUTPATIENT)
Dept: PHYSICAL THERAPY | Facility: CLINIC | Age: 67
End: 2022-10-18
Attending: ORTHOPAEDIC SURGERY
Payer: COMMERCIAL

## 2022-10-18 ENCOUNTER — ANESTHESIA EVENT (OUTPATIENT)
Dept: SURGERY | Facility: CLINIC | Age: 67
End: 2022-10-18
Payer: COMMERCIAL

## 2022-10-18 DIAGNOSIS — Z98.890 POST-OPERATIVE STATE: Primary | ICD-10-CM

## 2022-10-18 PROCEDURE — 258N000003 HC RX IP 258 OP 636: Performed by: PHYSICIAN ASSISTANT

## 2022-10-18 PROCEDURE — 250N000011 HC RX IP 250 OP 636: Performed by: PHYSICIAN ASSISTANT

## 2022-10-18 PROCEDURE — 97161 PT EVAL LOW COMPLEX 20 MIN: CPT | Mod: GP

## 2022-10-18 PROCEDURE — 97116 GAIT TRAINING THERAPY: CPT | Mod: GP

## 2022-10-18 PROCEDURE — 250N000013 HC RX MED GY IP 250 OP 250 PS 637: Performed by: PHYSICIAN ASSISTANT

## 2022-10-18 PROCEDURE — 250N000011 HC RX IP 250 OP 636: Performed by: ANESTHESIOLOGY

## 2022-10-18 PROCEDURE — 258N000001 HC RX 258: Performed by: ORTHOPAEDIC SURGERY

## 2022-10-18 PROCEDURE — 99207 PR NO BILLABLE SERVICE THIS VISIT: CPT | Performed by: PHYSICIAN ASSISTANT

## 2022-10-18 PROCEDURE — 360N000077 HC SURGERY LEVEL 4, PER MIN: Performed by: ORTHOPAEDIC SURGERY

## 2022-10-18 PROCEDURE — 250N000009 HC RX 250: Performed by: ORTHOPAEDIC SURGERY

## 2022-10-18 PROCEDURE — 710N000009 HC RECOVERY PHASE 1, LEVEL 1, PER MIN: Performed by: ORTHOPAEDIC SURGERY

## 2022-10-18 PROCEDURE — C1776 JOINT DEVICE (IMPLANTABLE): HCPCS | Performed by: ORTHOPAEDIC SURGERY

## 2022-10-18 PROCEDURE — 97530 THERAPEUTIC ACTIVITIES: CPT | Mod: GP

## 2022-10-18 PROCEDURE — 258N000003 HC RX IP 258 OP 636: Performed by: ANESTHESIOLOGY

## 2022-10-18 PROCEDURE — 999N000063 XR KNEE PORT LEFT 1/2 VIEWS: Mod: LT

## 2022-10-18 PROCEDURE — 258N000003 HC RX IP 258 OP 636: Performed by: NURSE ANESTHETIST, CERTIFIED REGISTERED

## 2022-10-18 PROCEDURE — 999N000141 HC STATISTIC PRE-PROCEDURE NURSING ASSESSMENT: Performed by: ORTHOPAEDIC SURGERY

## 2022-10-18 PROCEDURE — 250N000011 HC RX IP 250 OP 636: Performed by: NURSE ANESTHETIST, CERTIFIED REGISTERED

## 2022-10-18 PROCEDURE — 250N000009 HC RX 250: Performed by: NURSE ANESTHETIST, CERTIFIED REGISTERED

## 2022-10-18 PROCEDURE — C1713 ANCHOR/SCREW BN/BN,TIS/BN: HCPCS | Performed by: ORTHOPAEDIC SURGERY

## 2022-10-18 PROCEDURE — 370N000017 HC ANESTHESIA TECHNICAL FEE, PER MIN: Performed by: ORTHOPAEDIC SURGERY

## 2022-10-18 PROCEDURE — 250N000011 HC RX IP 250 OP 636: Performed by: ORTHOPAEDIC SURGERY

## 2022-10-18 PROCEDURE — 272N000001 HC OR GENERAL SUPPLY STERILE: Performed by: ORTHOPAEDIC SURGERY

## 2022-10-18 DEVICE — LEGION POSTERIOR STABILIZED HIGH                                    FLEX HIGHLY CROSS LINKED                                    POLYETHYLENE SIZE 3-4 9MM
Type: IMPLANTABLE DEVICE | Site: KNEE | Status: FUNCTIONAL
Brand: LEGION

## 2022-10-18 DEVICE — FULL DOSE BONE CEMENT, 10 PACK CATALOG NUMBER IS 6191-1-010
Type: IMPLANTABLE DEVICE | Site: KNEE | Status: FUNCTIONAL
Brand: SIMPLEX

## 2022-10-18 DEVICE — LEGION PS NP NARROW FEMORAL SZ 6 LT
Type: IMPLANTABLE DEVICE | Site: KNEE | Status: FUNCTIONAL
Brand: LEGION

## 2022-10-18 DEVICE — GENESIS II NON-POROUS TIBIAL                                    BASEPLATE SIZE 4 LEFT
Type: IMPLANTABLE DEVICE | Site: KNEE | Status: FUNCTIONAL
Brand: GENESIS II

## 2022-10-18 DEVICE — GENESIS II RESURFACING PATELLAR 35MM
Type: IMPLANTABLE DEVICE | Site: KNEE | Status: FUNCTIONAL
Brand: GENESIS II

## 2022-10-18 RX ORDER — TRANEXAMIC ACID 650 MG/1
1950 TABLET ORAL ONCE
Status: COMPLETED | OUTPATIENT
Start: 2022-10-18 | End: 2022-10-18

## 2022-10-18 RX ORDER — OXYCODONE HYDROCHLORIDE 5 MG/1
5 TABLET ORAL EVERY 4 HOURS PRN
Status: DISCONTINUED | OUTPATIENT
Start: 2022-10-18 | End: 2022-10-18 | Stop reason: HOSPADM

## 2022-10-18 RX ORDER — NALOXONE HYDROCHLORIDE 0.4 MG/ML
0.4 INJECTION, SOLUTION INTRAMUSCULAR; INTRAVENOUS; SUBCUTANEOUS
Status: DISCONTINUED | OUTPATIENT
Start: 2022-10-18 | End: 2022-10-19 | Stop reason: HOSPADM

## 2022-10-18 RX ORDER — LIDOCAINE 40 MG/G
CREAM TOPICAL
Status: DISCONTINUED | OUTPATIENT
Start: 2022-10-18 | End: 2022-10-18 | Stop reason: HOSPADM

## 2022-10-18 RX ORDER — ONDANSETRON 4 MG/1
4 TABLET, ORALLY DISINTEGRATING ORAL EVERY 6 HOURS PRN
Status: DISCONTINUED | OUTPATIENT
Start: 2022-10-18 | End: 2022-10-19 | Stop reason: HOSPADM

## 2022-10-18 RX ORDER — DEXAMETHASONE SODIUM PHOSPHATE 4 MG/ML
INJECTION, SOLUTION INTRA-ARTICULAR; INTRALESIONAL; INTRAMUSCULAR; INTRAVENOUS; SOFT TISSUE PRN
Status: DISCONTINUED | OUTPATIENT
Start: 2022-10-18 | End: 2022-10-18

## 2022-10-18 RX ORDER — HYDROMORPHONE HCL IN WATER/PF 6 MG/30 ML
0.4 PATIENT CONTROLLED ANALGESIA SYRINGE INTRAVENOUS
Status: DISCONTINUED | OUTPATIENT
Start: 2022-10-18 | End: 2022-10-19 | Stop reason: HOSPADM

## 2022-10-18 RX ORDER — ACETAMINOPHEN 325 MG/1
975 TABLET ORAL EVERY 8 HOURS
Status: DISCONTINUED | OUTPATIENT
Start: 2022-10-18 | End: 2022-10-19 | Stop reason: HOSPADM

## 2022-10-18 RX ORDER — OXYCODONE HYDROCHLORIDE 5 MG/1
5 TABLET ORAL EVERY 4 HOURS PRN
Status: DISCONTINUED | OUTPATIENT
Start: 2022-10-18 | End: 2022-10-19 | Stop reason: HOSPADM

## 2022-10-18 RX ORDER — HYDROMORPHONE HYDROCHLORIDE 1 MG/ML
0.2 INJECTION, SOLUTION INTRAMUSCULAR; INTRAVENOUS; SUBCUTANEOUS EVERY 5 MIN PRN
Status: DISCONTINUED | OUTPATIENT
Start: 2022-10-18 | End: 2022-10-18 | Stop reason: HOSPADM

## 2022-10-18 RX ORDER — ONDANSETRON 2 MG/ML
4 INJECTION INTRAMUSCULAR; INTRAVENOUS EVERY 6 HOURS PRN
Status: DISCONTINUED | OUTPATIENT
Start: 2022-10-18 | End: 2022-10-19 | Stop reason: HOSPADM

## 2022-10-18 RX ORDER — SODIUM CHLORIDE, SODIUM LACTATE, POTASSIUM CHLORIDE, CALCIUM CHLORIDE 600; 310; 30; 20 MG/100ML; MG/100ML; MG/100ML; MG/100ML
INJECTION, SOLUTION INTRAVENOUS CONTINUOUS
Status: ACTIVE | OUTPATIENT
Start: 2022-10-18 | End: 2022-10-19

## 2022-10-18 RX ORDER — ACETAMINOPHEN 325 MG/1
650 TABLET ORAL EVERY 4 HOURS PRN
Status: DISCONTINUED | OUTPATIENT
Start: 2022-10-21 | End: 2022-10-19 | Stop reason: HOSPADM

## 2022-10-18 RX ORDER — PANTOPRAZOLE SODIUM 40 MG/1
40 TABLET, DELAYED RELEASE ORAL
Status: DISCONTINUED | OUTPATIENT
Start: 2022-10-19 | End: 2022-10-19 | Stop reason: HOSPADM

## 2022-10-18 RX ORDER — SODIUM CHLORIDE, SODIUM LACTATE, POTASSIUM CHLORIDE, CALCIUM CHLORIDE 600; 310; 30; 20 MG/100ML; MG/100ML; MG/100ML; MG/100ML
INJECTION, SOLUTION INTRAVENOUS CONTINUOUS
Status: DISCONTINUED | OUTPATIENT
Start: 2022-10-18 | End: 2022-10-18 | Stop reason: HOSPADM

## 2022-10-18 RX ORDER — CELECOXIB 200 MG/1
400 CAPSULE ORAL ONCE
Status: COMPLETED | OUTPATIENT
Start: 2022-10-18 | End: 2022-10-18

## 2022-10-18 RX ORDER — HYDROXYZINE HYDROCHLORIDE 10 MG/1
10 TABLET, FILM COATED ORAL EVERY 6 HOURS PRN
Status: DISCONTINUED | OUTPATIENT
Start: 2022-10-18 | End: 2022-10-19 | Stop reason: HOSPADM

## 2022-10-18 RX ORDER — LIDOCAINE HYDROCHLORIDE 20 MG/ML
INJECTION, SOLUTION INFILTRATION; PERINEURAL PRN
Status: DISCONTINUED | OUTPATIENT
Start: 2022-10-18 | End: 2022-10-18

## 2022-10-18 RX ORDER — FENTANYL CITRATE 50 UG/ML
50 INJECTION, SOLUTION INTRAMUSCULAR; INTRAVENOUS
Status: DISCONTINUED | OUTPATIENT
Start: 2022-10-18 | End: 2022-10-18 | Stop reason: HOSPADM

## 2022-10-18 RX ORDER — CELECOXIB 100 MG/1
100 CAPSULE ORAL 2 TIMES DAILY
Status: DISCONTINUED | OUTPATIENT
Start: 2022-10-19 | End: 2022-10-19 | Stop reason: HOSPADM

## 2022-10-18 RX ORDER — CEFAZOLIN SODIUM/WATER 2 G/20 ML
2 SYRINGE (ML) INTRAVENOUS SEE ADMIN INSTRUCTIONS
Status: DISCONTINUED | OUTPATIENT
Start: 2022-10-18 | End: 2022-10-18 | Stop reason: HOSPADM

## 2022-10-18 RX ORDER — ATORVASTATIN CALCIUM 20 MG/1
20 TABLET, FILM COATED ORAL EVERY EVENING
Status: DISCONTINUED | OUTPATIENT
Start: 2022-10-19 | End: 2022-10-19 | Stop reason: HOSPADM

## 2022-10-18 RX ORDER — PROPOFOL 10 MG/ML
INJECTION, EMULSION INTRAVENOUS CONTINUOUS PRN
Status: DISCONTINUED | OUTPATIENT
Start: 2022-10-18 | End: 2022-10-18

## 2022-10-18 RX ORDER — ASPIRIN 81 MG/1
162 TABLET ORAL DAILY
Status: DISCONTINUED | OUTPATIENT
Start: 2022-10-18 | End: 2022-10-19 | Stop reason: HOSPADM

## 2022-10-18 RX ORDER — NALOXONE HYDROCHLORIDE 0.4 MG/ML
0.2 INJECTION, SOLUTION INTRAMUSCULAR; INTRAVENOUS; SUBCUTANEOUS
Status: DISCONTINUED | OUTPATIENT
Start: 2022-10-18 | End: 2022-10-19 | Stop reason: HOSPADM

## 2022-10-18 RX ORDER — ACETAMINOPHEN 325 MG/1
975 TABLET ORAL ONCE
Status: DISCONTINUED | OUTPATIENT
Start: 2022-10-18 | End: 2022-10-18 | Stop reason: HOSPADM

## 2022-10-18 RX ORDER — CEFAZOLIN SODIUM/WATER 2 G/20 ML
2 SYRINGE (ML) INTRAVENOUS
Status: COMPLETED | OUTPATIENT
Start: 2022-10-18 | End: 2022-10-18

## 2022-10-18 RX ORDER — MAGNESIUM HYDROXIDE 1200 MG/15ML
LIQUID ORAL PRN
Status: DISCONTINUED | OUTPATIENT
Start: 2022-10-18 | End: 2022-10-18 | Stop reason: HOSPADM

## 2022-10-18 RX ORDER — POLYETHYLENE GLYCOL 3350 17 G/17G
17 POWDER, FOR SOLUTION ORAL DAILY
Status: DISCONTINUED | OUTPATIENT
Start: 2022-10-19 | End: 2022-10-19 | Stop reason: HOSPADM

## 2022-10-18 RX ORDER — GUAIFENESIN 600 MG/1
600 TABLET, EXTENDED RELEASE ORAL 2 TIMES DAILY
Status: DISCONTINUED | OUTPATIENT
Start: 2022-10-18 | End: 2022-10-19 | Stop reason: HOSPADM

## 2022-10-18 RX ORDER — CEFAZOLIN SODIUM 1 G/3ML
1 INJECTION, POWDER, FOR SOLUTION INTRAMUSCULAR; INTRAVENOUS EVERY 8 HOURS
Status: COMPLETED | OUTPATIENT
Start: 2022-10-18 | End: 2022-10-19

## 2022-10-18 RX ORDER — HYDRALAZINE HYDROCHLORIDE 20 MG/ML
2.5-5 INJECTION INTRAMUSCULAR; INTRAVENOUS EVERY 10 MIN PRN
Status: DISCONTINUED | OUTPATIENT
Start: 2022-10-18 | End: 2022-10-18 | Stop reason: HOSPADM

## 2022-10-18 RX ORDER — AMOXICILLIN 250 MG
1 CAPSULE ORAL 2 TIMES DAILY
Status: DISCONTINUED | OUTPATIENT
Start: 2022-10-18 | End: 2022-10-19 | Stop reason: HOSPADM

## 2022-10-18 RX ORDER — OXYCODONE HYDROCHLORIDE 5 MG/1
10 TABLET ORAL EVERY 4 HOURS PRN
Status: DISCONTINUED | OUTPATIENT
Start: 2022-10-18 | End: 2022-10-19 | Stop reason: HOSPADM

## 2022-10-18 RX ORDER — LABETALOL HYDROCHLORIDE 5 MG/ML
10 INJECTION, SOLUTION INTRAVENOUS
Status: DISCONTINUED | OUTPATIENT
Start: 2022-10-18 | End: 2022-10-18 | Stop reason: HOSPADM

## 2022-10-18 RX ORDER — LIDOCAINE 40 MG/G
CREAM TOPICAL
Status: DISCONTINUED | OUTPATIENT
Start: 2022-10-18 | End: 2022-10-19 | Stop reason: HOSPADM

## 2022-10-18 RX ORDER — DIMENHYDRINATE 50 MG/ML
25 INJECTION, SOLUTION INTRAMUSCULAR; INTRAVENOUS
Status: DISCONTINUED | OUTPATIENT
Start: 2022-10-18 | End: 2022-10-18 | Stop reason: HOSPADM

## 2022-10-18 RX ORDER — ONDANSETRON 2 MG/ML
INJECTION INTRAMUSCULAR; INTRAVENOUS PRN
Status: DISCONTINUED | OUTPATIENT
Start: 2022-10-18 | End: 2022-10-18

## 2022-10-18 RX ORDER — FENTANYL CITRATE 50 UG/ML
50 INJECTION, SOLUTION INTRAMUSCULAR; INTRAVENOUS EVERY 5 MIN PRN
Status: DISCONTINUED | OUTPATIENT
Start: 2022-10-18 | End: 2022-10-18 | Stop reason: HOSPADM

## 2022-10-18 RX ORDER — DIPHENHYDRAMINE HCL 12.5MG/5ML
12.5 LIQUID (ML) ORAL EVERY 6 HOURS PRN
Status: DISCONTINUED | OUTPATIENT
Start: 2022-10-18 | End: 2022-10-19 | Stop reason: HOSPADM

## 2022-10-18 RX ORDER — PROCHLORPERAZINE MALEATE 5 MG
5 TABLET ORAL EVERY 6 HOURS PRN
Status: DISCONTINUED | OUTPATIENT
Start: 2022-10-18 | End: 2022-10-19 | Stop reason: HOSPADM

## 2022-10-18 RX ORDER — HYDROMORPHONE HCL IN WATER/PF 6 MG/30 ML
0.2 PATIENT CONTROLLED ANALGESIA SYRINGE INTRAVENOUS
Status: DISCONTINUED | OUTPATIENT
Start: 2022-10-18 | End: 2022-10-19 | Stop reason: HOSPADM

## 2022-10-18 RX ORDER — ONDANSETRON 4 MG/1
4 TABLET, ORALLY DISINTEGRATING ORAL EVERY 30 MIN PRN
Status: DISCONTINUED | OUTPATIENT
Start: 2022-10-18 | End: 2022-10-18 | Stop reason: HOSPADM

## 2022-10-18 RX ORDER — BISACODYL 10 MG
10 SUPPOSITORY, RECTAL RECTAL DAILY PRN
Status: DISCONTINUED | OUTPATIENT
Start: 2022-10-18 | End: 2022-10-19 | Stop reason: HOSPADM

## 2022-10-18 RX ORDER — VANCOMYCIN HYDROCHLORIDE 1 G/20ML
INJECTION, POWDER, LYOPHILIZED, FOR SOLUTION INTRAVENOUS PRN
Status: DISCONTINUED | OUTPATIENT
Start: 2022-10-18 | End: 2022-10-18 | Stop reason: HOSPADM

## 2022-10-18 RX ORDER — ACETAMINOPHEN 325 MG/1
975 TABLET ORAL ONCE
Status: COMPLETED | OUTPATIENT
Start: 2022-10-18 | End: 2022-10-18

## 2022-10-18 RX ORDER — ONDANSETRON 2 MG/ML
4 INJECTION INTRAMUSCULAR; INTRAVENOUS EVERY 30 MIN PRN
Status: DISCONTINUED | OUTPATIENT
Start: 2022-10-18 | End: 2022-10-18 | Stop reason: HOSPADM

## 2022-10-18 RX ADMIN — GUAIFENESIN 600 MG: 600 TABLET ORAL at 21:06

## 2022-10-18 RX ADMIN — SODIUM CHLORIDE, POTASSIUM CHLORIDE, SODIUM LACTATE AND CALCIUM CHLORIDE: 600; 310; 30; 20 INJECTION, SOLUTION INTRAVENOUS at 10:03

## 2022-10-18 RX ADMIN — DEXAMETHASONE SODIUM PHOSPHATE 10 MG: 4 INJECTION, SOLUTION INTRA-ARTICULAR; INTRALESIONAL; INTRAMUSCULAR; INTRAVENOUS; SOFT TISSUE at 11:44

## 2022-10-18 RX ADMIN — MIDAZOLAM HYDROCHLORIDE 2 MG: 1 INJECTION, SOLUTION INTRAMUSCULAR; INTRAVENOUS at 10:33

## 2022-10-18 RX ADMIN — ASPIRIN 162 MG: 81 TABLET, COATED ORAL at 21:07

## 2022-10-18 RX ADMIN — ACETAMINOPHEN 975 MG: 325 TABLET, FILM COATED ORAL at 09:45

## 2022-10-18 RX ADMIN — MIDAZOLAM 2 MG: 1 INJECTION INTRAMUSCULAR; INTRAVENOUS at 11:18

## 2022-10-18 RX ADMIN — ACETAMINOPHEN 975 MG: 325 TABLET, FILM COATED ORAL at 18:25

## 2022-10-18 RX ADMIN — ONDANSETRON 4 MG: 2 INJECTION INTRAMUSCULAR; INTRAVENOUS at 11:44

## 2022-10-18 RX ADMIN — TRANEXAMIC ACID 1950 MG: 650 TABLET ORAL at 09:45

## 2022-10-18 RX ADMIN — SENNOSIDES AND DOCUSATE SODIUM 1 TABLET: 50; 8.6 TABLET ORAL at 21:06

## 2022-10-18 RX ADMIN — PROPOFOL 100 MCG/KG/MIN: 10 INJECTION, EMULSION INTRAVENOUS at 11:30

## 2022-10-18 RX ADMIN — CELECOXIB 400 MG: 200 CAPSULE ORAL at 09:45

## 2022-10-18 RX ADMIN — Medication 2 G: at 11:18

## 2022-10-18 RX ADMIN — SODIUM CHLORIDE, POTASSIUM CHLORIDE, SODIUM LACTATE AND CALCIUM CHLORIDE: 600; 310; 30; 20 INJECTION, SOLUTION INTRAVENOUS at 16:05

## 2022-10-18 RX ADMIN — LIDOCAINE HYDROCHLORIDE 40 MG: 20 INJECTION, SOLUTION INFILTRATION; PERINEURAL at 11:30

## 2022-10-18 RX ADMIN — PHENYLEPHRINE HYDROCHLORIDE 0.2 MCG/KG/MIN: 10 INJECTION INTRAVENOUS at 11:57

## 2022-10-18 RX ADMIN — CEFAZOLIN 1 G: 1 INJECTION, POWDER, FOR SOLUTION INTRAMUSCULAR; INTRAVENOUS at 18:25

## 2022-10-18 ASSESSMENT — LIFESTYLE VARIABLES: TOBACCO_USE: 0

## 2022-10-18 ASSESSMENT — ENCOUNTER SYMPTOMS: DYSRHYTHMIAS: 1

## 2022-10-18 ASSESSMENT — ACTIVITIES OF DAILY LIVING (ADL)
ADLS_ACUITY_SCORE: 20

## 2022-10-18 NOTE — ANESTHESIA PREPROCEDURE EVALUATION
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 Loomis      No Known Allergies   Social History     Tobacco Use     Smoking status: Never     Smokeless tobacco: Never   Substance Use Topics     Alcohol use: Not Currently      Wt Readings from Last 1 Encounters:   10/18/22 65.8 kg (145 lb)        Anesthesia Evaluation   Pt has had prior anesthetic.     No history of anesthetic complications       ROS/MED HX  ENT/Pulmonary:     (+) allergic rhinitis,  (-) tobacco use   Neurologic:       Cardiovascular:     (+) Dyslipidemia -----dysrhythmias (H/o SVT s/p ablation), a-fib and Other, Previous cardiac testing   Echo: Date: Results:    Stress Test: Date: 19 Results:  Interpretation Summary  1. Average exercise capacity, target HR achieved.  2. The patient exhibited no chest pain during exercise.  3. This was a normal stress EKG with no evidence of stress-induced ischemia.  4. Rest echo: Normal left  ventricular function and wall motion at rest. The visual ejection fraction is estimated at 55-60%.  5. Stress echo: This was a normal stress echocardiogram with no evidence of stress-induced ischemia. The visual ejection fraction is estimated at 65-70%.       Stress  The patient exercised 7:00. Exercise was stopped due to fatigue. There was a normal BP response to exercise. The patient exhibited no chest pain during exercise. Target Heart Rate was achieved. The Duke treadmill score was low risk ( >5 Khanna score). This was a normal stress EKG with no evidence of stress-induced ischemia.This was a normal stress echocardiogram with no evidence of stress-induced ischemia. The visual ejection fraction is estimated at 65-70%. Normal resting wall motion and no stress-induced wall motion abnormality.    Baseline  Resting ECG is normal. The patient is in normal sinus rhythm. Normal left ventricular function and wall motion at rest. The visual ejection fraction is estimated at 55-60%.    Mitral Valve  There is trace mitral regurgitation.    Tricuspid Valve  There is trace tricuspid regurgitation.  ECG Reviewed: Date: 10/11/22 Results:  NSR  Cath: Date: Results:      METS/Exercise Tolerance:     Hematologic:       Musculoskeletal:   (+) arthritis,     GI/Hepatic:     (+) GERD,     Renal/Genitourinary:       Endo:       Psychiatric/Substance Use:     (+) psychiatric history depression and anxiety     Infectious Disease:       Malignancy:       Other:               OUTSIDE LABS:  CBC:   Lab Results   Component Value Date    WBC 13.2 (H) 12/28/2020    WBC 9.6 06/18/2020    HGB 13.9 12/28/2020    HGB 14.2 06/18/2020    HCT 43.4 12/28/2020    HCT 43.0 06/18/2020     12/28/2020     06/18/2020     BMP:   Lab Results   Component Value Date     12/28/2020     06/18/2020    POTASSIUM 3.9 12/28/2020    POTASSIUM 3.2 (L) 06/18/2020    CHLORIDE 105 12/28/2020    CHLORIDE 106 06/18/2020    CO2 25 12/28/2020    CO2  28 06/18/2020    BUN 17 12/28/2020    BUN 8 06/18/2020    CR 0.81 12/28/2020    CR 0.71 06/18/2020     (H) 12/28/2020     (H) 06/18/2020     COAGS: No results found for: PTT, INR, FIBR  POC:   Lab Results   Component Value Date    HCG Negative 11/21/2013     HEPATIC:   Lab Results   Component Value Date    ALBUMIN 3.5 06/08/2019    PROTTOTAL 7.2 06/08/2019    ALT 31 06/08/2019    AST 30 06/08/2019    ALKPHOS 159 (H) 06/08/2019    BILITOTAL 0.2 06/08/2019     OTHER:   Lab Results   Component Value Date    A1C 5.1 06/27/2011    NASH 9.2 12/28/2020    MAG 2.0 12/30/2015    LIPASE 134 06/08/2019    TSH 2.10 06/08/2019    T4 0.97 10/16/2014    CRP 2.6 09/24/2010       Anesthesia Plan    ASA Status:  2   NPO Status:  NPO Appropriate    Anesthesia Type: Spinal.              Consents    Anesthesia Plan(s) and associated risks, benefits, and realistic alternatives discussed. Questions answered and patient/representative(s) expressed understanding.    - Discussed:     - Discussed with:  Patient         Postoperative Care    Pain management: IV analgesics, Oral pain medications, Peripheral nerve block (Single Shot), Multi-modal analgesia, Neuraxial analgesia.   PONV prophylaxis: Ondansetron (or other 5HT-3)     Comments:                Norberto Pearl MD

## 2022-10-18 NOTE — OR NURSING
Report called to Royce GIL. Patient transferred to 2325 on cart with belongings. No discharge meds in pacu.  called and updated on transfer.

## 2022-10-18 NOTE — PROGRESS NOTES
10/18/22 1700   Appointment Info   Signing Clinician's Name / Credentials (PT) Catrachito Burton, PT, DPT   Rehab Comments (PT) WBAT, ROM 0-flexion tolerance   Living Environment   People in Home spouse   Current Living Arrangements house   Home Accessibility stairs to enter home   Number of Stairs, Main Entrance 1   Stair Railings, Main Entrance none   Transportation Anticipated family or friend will provide   Living Environment Comments Pt lives in [facility] with 1 CONNIE, spouse will be home for good 24/7 support   Self-Care   Usual Activity Tolerance good   Current Activity Tolerance moderate   Equipment Currently Used at Home none   Fall history within last six months no   Activity/Exercise/Self-Care Comment Pt IND at baseline with mobility and ADL's   General Information   Onset of Illness/Injury or Date of Surgery 10/18/22   Referring Physician Kory Farooq PA-C   Patient/Family Therapy Goals Statement (PT) go home   Pertinent History of Current Problem (include personal factors and/or comorbidities that impact the POC) Per chart review, Ms. Kaur is a 67 year old female with PMHx of hx of atrial fibrillation s/p ablation (2010) not on chronic anticoagulation, CEM, GERD, and osteoarthritis who underwent left total knee arthoplasty with Dr. Yarbrough on 10/18/2022, POD#0   Existing Precautions/Restrictions fall;weight bearing;other (see comments)  (ROM 0-flexion tolerance)   Weight-Bearing Status - LLE weight-bearing as tolerated   Cognition   Affect/Mental Status (Cognition) WNL   Orientation Status (Cognition) oriented x 4   Follows Commands (Cognition) WNL   Pain Assessment   Patient Currently in Pain   (mild incision pain)   Integumentary/Edema   Integumentary/Edema Comments left knee covered in ace wrap   Posture    Posture Not impaired   Range of Motion (ROM)   Range of Motion ROM deficits secondary to surgical procedure;ROM deficits secondary to pain   ROM Comment deficits with left knee after TKA,  otherwise appears grossly WFL   Left Knee Extension/Flexion ROM   Left Knee Extension/Flexion AROM - degrees 0-75   Strength (Manual Muscle Testing)   Strength (Manual Muscle Testing) Able to perform R SLR;Able to perform L SLR;Deficits observed during functional mobility   Strength Comments not formally assessed, deficits on left knee and LLE noted during mobility, appears grossly antigravity   Bed Mobility   Comment, (Bed Mobility) supine<>sit SBA   Transfers   Comment, (Transfers) sit<>stand with FWW CGA   Gait/Stairs (Locomotion)   [place] Level (Gait) contact guard   Assistive Device (Gait) walker, front-wheeled   Distance in Feet (Required for LE Total Joints) 10'   Distance in Feet (Gait) 300'   Pattern (Gait) step-through   Deviations/Abnormal Patterns (Gait) antalgic;base of support, narrow;gait speed decreased;joy decreased;stride length decreased;weight shifting decreased   Maintains Weight-bearing Status (Gait) able to maintain   Comment, (Gait/Stairs) ambulate with FWW CGA   Balance   Balance Comments sitting EOB unsupported and steady, standing with FWW steady, deficits with dynamic balance after TKA   Sensory Examination   Sensory Perception Comments pt reports mildly diminished sensation on left foot and mild tingling in left foot   Clinical Impression   Criteria for Skilled Therapeutic Intervention Yes, treatment indicated   PT Diagnosis (PT) impaired gait   Influenced by the following impairments pain, deficits with ROM, strength, balance   Functional limitations due to impairments activity tolerance, bed mobility, transfers, ambulation   Clinical Presentation (PT Evaluation Complexity) Stable/Uncomplicated   Clinical Presentation Rationale clinical judgement   Clinical Decision Making (Complexity) low complexity   Planned Therapy Interventions (PT) balance training;bed mobility training;cryotherapy;gait training;home exercise program;ROM (range of motion);stair  training;strengthening;patient/family education;transfer training;progressive activity/exercise   Anticipated Equipment Needs at Discharge (PT)   (pt will provide standard walker)   Risk & Benefits of therapy have been explained evaluation/treatment results reviewed;care plan/treatment goals reviewed;risks/benefits reviewed;current/potential barriers reviewed;participants voiced agreement with care plan;participants included;patient   PT Total Evaluation Time   PT Eval, Low Complexity Minutes (58029) 10   Plan of Care Review   Plan of Care Reviewed With patient   Therapy Certification   Start of care date 10/18/22   Certification date from 10/18/22   Certification date to 10/25/22   Medical Diagnosis left TKA   Physical Therapy Goals   PT Frequency 2x/day   PT Predicted Duration/Target Date for Goal Attainment 10/19/22   PT Goals Bed Mobility;Transfers;Gait;Stairs   PT: Bed Mobility Modified independent;Supine to/from sit;Rolling;Bridging   PT: Transfers Modified independent;Sit to/from stand;Assistive device   PT: Gait Modified independent;Rolling walker;150 feet   PT: Stairs Minimal assist;1 stair   Therapeutic Procedure/Exercise   Ther. Procedure: strength, endurance, ROM, flexibillity Minutes (39848) 5   Symptoms Noted During/After Treatment none   Treatment Detail/Skilled Intervention Educated on post surgical benefits of TE on circulation and functional ROM and strength. Left pt with TE handout. With pt in supine cued for AP's x 25, on LLE: heel slides x 10, quad sets x 10. Tolerated all TE well   Therapeutic Activity   Therapeutic Activities: dynamic activities to improve functional performance Minutes (71898) 8   Symptoms Noted During/After Treatment Increased pain   Treatment Detail/Skilled Intervention Greeted pt supine in bed, educated on WBAT, ROM to flexion tolerance, and need to keep knee straight when resting. With HOB elevated, supine<>sit SBA, pt moved well, cues for hips closer to EOB to prep for  standing pt able to do so IND. Sit<>stand x 2 with FWW CGA progressing to SBA, vcs for safe walker and hand placement. Stand<>sit to toilet using FWW, grab bar, SBA, pt able to void and IND with pericares   Gait Training   Gait Training Minutes (69992) 10   Symptoms Noted During/After Treatment (Gait Training) increased pain   Treatment Detail/Skilled Intervention Pt walker readjusted to proper height. Pt ambulated into hallway with FWW and CGA progressing to SBA, pt moved well and used uneven step through pattern, able to perform with equal step lengths with cueing. Cues for forward gaze. Pt moved well and had no overt LOB noted. Encouraged pt to walk with nursing as able   [place] Level (Gait Training) contact guard   Physical Assistance Level (Gait Training) verbal cues;1 person assist   Weight Bearing (Gait Training) weight-bearing as tolerated   Pattern Analysis (Gait Training) swing-through gait   Gait Analysis Deviations decreased joy;decreased velocity of limb motion;decreased weight-shifting ability;decreased step length;decreased stride length   Impairments (Gait Analysis/Training) pain;strength decreased   PT Discharge Planning   PT Plan review/progress HEP, 1 CONNIE home, progress gait distance, bed mobility, transfers   PT Discharge Recommendation (DC Rec)   (defer to ortho)   PT Rationale for DC Rec Pt below basline but moving very well and using safe technique. Anticipate pt will progress and by discharge be at/near Mod-I with bed mobility, Mod-I with transfers using FWW, Mod-I ambulating with FWW and Karen for 1 step. Pt will have good assit from spouse 24/7   PT Brief overview of current status bed mobility SBA, sit<>stand with FWW SBA, ambulate with FWW SBA   Total Session Time   Timed Code Treatment Minutes 23   Total Session Time (sum of timed and untimed services) 33       [facility]  OUTPATIENT PHYSICAL THERAPY EVALUATION  PLAN OF TREATMENT FOR OUTPATIENT  REHABILITATION  (COMPLETE FOR INITIAL CLAIMS ONLY)  Patient's Last Name, First Name, M.I.  YOB: 1955  Marissa Kaur                        Provider's Name  [facility] Medical Record No.  [id number]                             Onset Date:  10/18/22   Start of Care Date:  10/18/22   Type:     _X_PT   ___OT   ___SLP Medical Diagnosis:  left TKA              PT Diagnosis:  impaired gait Visits from SOC:  1     See note for plan of treatment, functional goals and certification details    I CERTIFY THE NEED FOR THESE SERVICES FURNISHED UNDER        THIS PLAN OF TREATMENT AND WHILE UNDER MY CARE     (Physician co-signature of this document indicates review and certification of the therapy plan).

## 2022-10-18 NOTE — ANESTHESIA PROCEDURE NOTES
"Intrathecal Procedure Note    Pre-Procedure   Staff -        Anesthesiologist:  Norberto Pearl MD       Performed By: anesthesiologist       Location: OR       Pre-Anesthestic Checklist: patient identified, IV checked, site marked, risks and benefits discussed, informed consent, monitors and equipment checked, pre-op evaluation and at physician/surgeon's request  Timeout:       Correct Patient: Yes        Correct Procedure: Yes        Correct Site: Yes        Correct Position: Yes   Procedure Documentation  Procedure: intrathecal       Patient Position: sitting       Skin prep: Betadine       Insertion Site: L3-4. (midline approach).       Spinal Needle Type: Rossi-Rogelio       Introducer used       Introducer: 20 G       # of attempts: 1 and  # of redirects:     Assessment/Narrative         Paresthesias: No.       CSF fluid: clear.       Opening pressure was cmH2O while  Sitting.       Comments:  Patient sitting on edge of OR bed, lower back cleaned and prepped in sterile fashion with betadine. 1% lido used to numb area. Introducer placed, spinal needle through introducer. Appropriate flow of CSF and confirmed with aspiration via syringe. Spinal dose given, 50 mg 2% mepivacaine. No complications.       FOR [facility] ([facility]) ONLY:   Pain Team Contact information: please page the Pain Team Via Cardio control. Search \"Pain\". During daytime hours, please page the attending first. At night please page the resident first.    "

## 2022-10-18 NOTE — CONSULTS
Ms. Kaur is a 67 year old female with PMHx of hx of atrial fibrillation s/p ablation (2010) not on chronic anticoagulation, CEM, GERD, and osteoarthritis who underwent left total knee arthoplasty with Dr. Yarbrough. Spinal block with an abductor canal block used.  EBL 25 ccs. Vitals largely stable aside from a few hypertensive episodes. Per bedside RN, no acute concerns.     Pre-op H&P reviewed. Note /80 at that time. Plan to record blood pressure readings daily and return in 6-8 weeks to review results. Resumed PTA meds including Zoloft, Prilosec, Lipitor, Mucinex. Note pre-op Hgb 14.     Reviewed surgical plan:     1. Antibiotic Prophylaxis was given included Ancef 2 gm. Antibiotics given within 1 hour of surgical incision and discontinued within 24 hrs.   2. DVT prophylaxis ASA given within 24 hours    3. Weight Bearing WBAT (Weight bearing as tolerated).   4. Discharge anticipated date POD #1 to Home   5. Pain Medication oxycodone, Tylenol and Celebrex  6. Change dressing on POD #1. Discharge with AG dressing.    Given the above, will defer formal consult. Routine post-operative cares, IVF, DVT prophylaxis, and pain management to orthopedic service. Will check some basic lab work in the AM to assess for acute blood loss anemia given surgery. PT and OT to assess per Ortho. Bowel regimen in place while on pain regimen. No changes to PTA medication regimen at discharge unless issues arise throughout stay. Happy to be reconsulted in the future if necessary. Appreciate consult.     Please don't hesitate to page with questions or concerns     Nicole Irvin PA-C   [phone] (7a-6pm)

## 2022-10-18 NOTE — ANESTHESIA POSTPROCEDURE EVALUATION
Patient: Marissa Kaur    Procedure: Procedure(s):  LEFT TOTAL KNEE ARTHROPLASTY       Anesthesia Type:  Spinal    Note:     Postop Pain Control: Uneventful            Sign Out: Well controlled pain   PONV: No   Neuro/Psych: Uneventful            Sign Out: Acceptable/Baseline neuro status   Airway/Respiratory: Uneventful            Sign Out: Acceptable/Baseline resp. status   CV/Hemodynamics: Uneventful            Sign Out: Acceptable CV status; No obvious hypovolemia; No obvious fluid overload   Other NRE: NONE   DID A NON-ROUTINE EVENT OCCUR? No           Last vitals:  Vitals Value Taken Time   /72 10/18/22 1445   Temp 36.5  C (97.7  F) 10/18/22 1330   Pulse 98 10/18/22 1446   Resp 34 10/18/22 1446   SpO2 97 % 10/18/22 1446   Vitals shown include unvalidated device data.    Electronically Signed By: Norberto Pearl MD  October 18, 2022  2:47 PM

## 2022-10-18 NOTE — PROCEDURES
DATE OF SERVICE: 10/18/2022    SURGEON   TONIA BURKETT MD     FIRST ASSISTANT   RAN HUMPHREY PA-C   Please bill for Mr. Humphrey as first assistant as there was no resident available to assist in this case. Assistants were necessary and performed positioning, draping, retraction, suturing and wound dressing tasks throughout the surgical procedure. The assistant was present for the entire procedure.    PREOPERATIVE DIAGNOSIS   Endstage left knee tricompartmental osteoarthritis.     POSTOPERATIVE DIAGNOSIS   Endstage left knee tricompartmental osteoarthritis.     PROCEDURE   Left total knee arthroplasty using [employer] components, a size 6 Legion posterior stabilized femur, a size 4 tibia, a size 35 mm round patella, and a size 9 posterior stabilized polyethylene insert.     INDICATIONS FOR SURGERY   Marissa Kaur [id number] is a 67 year old-year-old female with a long history of left knee pain. The patient had failed all of the conservative and reasonable options. After discussing with the patient, it was decided that they would benefit from the above-mentioned procedure. Informed consent was obtained. See clinic documentation for details.    ANESTHESIA   Spinal block with an abductor canal block.     FINDINGS   There was tricompartmental osteoarthritis. Ligaments were intact otherwise.     DESCRIPTION OF PROCEDURE   Marissa Kaur was correctly identified by myself in the PAR and their left lower extremity was marked. A single-shot adductor canal block was placed. The patient was then taken to the operating room where a spinal anesthetic was placed. The patient was placed supine. A tourniquet was placed around the left proximal thigh. A bump was placed underneath the left hip. The patient was then prepped with Avagard, followed by a 10 minute Betadine scrub, alcohol paint, DuraPrep and then draped in the usual fashion. A timeout was performed. The tourniquet was then inflated to 300 mmHg. The incision  was made just medial to the patella for the mini sub-vastus approach after injection with the anesthetic solution, and carried down with sharp dissection. The vastus medialis was then released from its bed and swept laterally. The fat pad excision was performed at this time, as well as a medial release. The knee was then brought into flexion. The anterior horns of the medial and lateral menisci were excised, as well as the ACL and PCL. A PCL retractor was placed. The external tibial cutting guide was placed at 0 degrees slope. This was pinned with a planned 2 mm cut off the medial side and the cut was made with an oscillating saw. This was sized to a(n) 4, pinned in place, and we had excellent alignment with an alignment hilary and cortical contact with the trial. The tibia was reamed and punched in the usual fashion and the trial tibial component was removed. Attention was next turned to the femur. An intramedullary hole was drilled and we placed the distal cutting guide at 5 degrees of valgus. The block was pinned into place and the distal cut was made in the usual fashion. We then placed the AP sizing guide and it was felt that a size 6 would give us the best fit. This was placed in 3 degrees of external rotation which matched up nicely with Agustin's line as well as the epicondylar access. The 4-in-1 cutting block was pinned into place. The anterior, posterior and chamfer cuts were then made in the usual fashion. The trial femoral component was placed and the box cut was made with the reamer and box chisel in the usual fashion. At this point, we trialed the knee. We placed a 9 mm spacer. We had excellent balance throughout. The tibial trial component was removed after it was marked in proper alignment. The patella was then clamped with 2 towel clips. An oscillating saw was used to make a flat cut. The patella was sized to a round 35 mm patella, the lug holes were drilled and then undermined in the usual fashion  with a curette. The trial patella was placed. It fit nicely and sat down completely. All trial components were removed at this point. All bony surfaces were drilled with a small 3 mm drill bit and then thoroughly irrigated and dried. The rest of the joint cocktail was injected into the posterior capsule. A bone plug was placed in the central femoral canal. Two batches of Simplex cement were prepared and placed on the tibia and tibial component. The tibial component was impacted in place and the excess cement was removed. Next, cement was placed on the femoral component and on the femur and the femoral component was impacted into place. The excess cement was removed. A size 9 spacer was placed and the knee fully extended. The patella was then irrigated, dried and cement was placed onto the patella and patellar component. The patellar component was clamped in place and the excess cement removed. The knee was then lavaged with a dilute Betadine solution for 3 minutes and then irrigated again with normal saline to remove all the Betadine. The permanent 9 mm spacer was inserted with the  tool. The patella was relocated, a gram of vancomycin powder was placed in the joint, the tourniquet was deflated, and then the retinaculum was closed with a running #2 Quill suture. The subdermal layer was closed with a 0 Stratafix running suture, subdermal layer closed with a 2-0 Stratafix running suture and the skin closed with 3-0 Quill. Steri-Strips, as well as sterile dressings, an ACE bandage and ice pack were placed. There were no complications. Sponge counts correct. Tourniquet time 38 minutes. The patient was taken to PAR in stable condition.     DRAINS  None    SPECIMENS  None    COMPLICATIONS  None    ESTIMATED BLOOD LOSS  25 mL    CONDITION ON DISCHARGE FROM OR  Satisfactory    PLAN  1. Antibiotic Prophylaxis was given included Ancef 2 gm. Antibiotics given within 1 hour of surgical incision and discontinued within  24 hrs.   2. DVT prophylaxis ASA given within 24 hours    3. Weight Bearing WBAT (Weight bearing as tolerated).   4. Discharge anticipated date POD #1 to Home   5. Pain Medication oxycodone, Tylenol and Celebrex  6. Change dressing on POD #1. Discharge with AG dressing.    TONIA BURKETT MD      @C(1)@ [facility] - [phone]

## 2022-10-18 NOTE — PLAN OF CARE
Arrived from PACU at 1500.  A&OX4.  Up with 1 and walker, ambulated with PT in the hallways.  Voided X1 using bedside commode.  Tolerated regular diet.  Denies pain.  Tingling/numbness still present in LLE.  Plan to discharge tomorrow morning.

## 2022-10-18 NOTE — INTERVAL H&P NOTE
"I have reviewed the surgical (or preoperative) H&P that is linked to this encounter, and examined the patient. There are no significant changes    Clinical Conditions Present on Arrival:  Clinically Significant Risk Factors Present on Admission                   # Overweight: Estimated body mass index is 26.95 kg/m  as calculated from the following:    Height as of this encounter: 1.562 m (5' 1.5\").    Weight as of this encounter: 65.8 kg (145 lb).       "

## 2022-10-18 NOTE — ANESTHESIA PROCEDURE NOTES
Adductor canal (targeting saphenous nerve) Procedure Note    Pre-Procedure   Staff -        Anesthesiologist:  Norberto Pearl MD       Performed By: anesthesiologist       Location: pre-op       Pre-Anesthestic Checklist: patient identified, IV checked, site marked, risks and benefits discussed, informed consent, monitors and equipment checked, pre-op evaluation, at physician/surgeon's request and post-op pain management  Timeout:       Correct Patient: Yes        Correct Procedure: Yes        Correct Site: Yes        Correct Position: Yes        Correct Laterality: Yes        Site Marked: Yes  Procedure Documentation  Procedure: Adductor canal (targeting saphenous nerve)       Patient Position: supine       Skin prep: Chloraprep       Local skin infiltrated with 1 mL of 1% lidocaine.        Needle Type: insulated       Needle Gauge: 21.        Needle Length (millimeters): 90        Ultrasound guided       1. Ultrasound was used to identify targeted nerve, plexus, vascular marker, or fascial plane and place a needle adjacent to it in real-time.       2. Ultrasound was used to visualize the spread of anesthetic in close proximity to the above referenced structure.       3. A permanent image is entered into the patient's record.       4. The visualized anatomic structures appeared normal.       5. There were no apparent abnormal pathologic findings.    Assessment/Narrative         The placement was negative for: blood aspirated, painful injection and site bleeding       Paresthesias: No.       Bolus given via needle..        Secured via.        Insertion/Infusion Method: Single Shot       Complications: none     Comments:  Bolus via needle, 15 mL of 0.5% ropivacaine with 1:400,000 epinephrine with 10mg Decadron.    Under ultrasound guidance, a 21 gauge needle was inserted and placed in close proximity to the saphenous nerve. Ultrasound was also used to visualize the spread of anesthetic in close proximity to  "the nerve being blocked. The nerve appeared anatomically normal, and there were no apparent abnormal pathological findings. Patient tolerated well, was mildly sedated but communicative throughout the procedure. A permanent ultrasound image was saved in the patient's record.    The surgeon has given a verbal order transferring care of this patient to me for the performance of regional analgesia block for post op pain control. It is requested of me because I am uniquely trained and qualified to perform this block and the surgeon is neither trained nor qualified to perform this procedure.         FOR [facility] ([facility]) ONLY:   Pain Team Contact information: please page the Pain Team Via Near[employer]. Search \"Pain\". During daytime hours, please page the attending first. At night please page the resident first.    "

## 2022-10-19 ENCOUNTER — APPOINTMENT (OUTPATIENT)
Dept: PHYSICAL THERAPY | Facility: CLINIC | Age: 67
End: 2022-10-19
Attending: ORTHOPAEDIC SURGERY
Payer: COMMERCIAL

## 2022-10-19 ENCOUNTER — APPOINTMENT (OUTPATIENT)
Dept: OCCUPATIONAL THERAPY | Facility: CLINIC | Age: 67
End: 2022-10-19
Attending: ORTHOPAEDIC SURGERY
Payer: COMMERCIAL

## 2022-10-19 VITALS
HEIGHT: 62 IN | OXYGEN SATURATION: 99 % | HEART RATE: 76 BPM | TEMPERATURE: 97.7 F | WEIGHT: 145 LBS | RESPIRATION RATE: 16 BRPM | SYSTOLIC BLOOD PRESSURE: 135 MMHG | DIASTOLIC BLOOD PRESSURE: 70 MMHG | BODY MASS INDEX: 26.68 KG/M2

## 2022-10-19 LAB
FASTING STATUS PATIENT QL REPORTED: NO
GLUCOSE BLD-MCNC: 79 MG/DL (ref 70–99)
HGB BLD-MCNC: 12.5 G/DL (ref 11.7–15.7)

## 2022-10-19 PROCEDURE — 36415 COLL VENOUS BLD VENIPUNCTURE: CPT | Performed by: ORTHOPAEDIC SURGERY

## 2022-10-19 PROCEDURE — 97110 THERAPEUTIC EXERCISES: CPT | Mod: GP,CQ | Performed by: PHYSICAL THERAPY ASSISTANT

## 2022-10-19 PROCEDURE — 85018 HEMOGLOBIN: CPT | Performed by: PHYSICIAN ASSISTANT

## 2022-10-19 PROCEDURE — 97116 GAIT TRAINING THERAPY: CPT | Mod: GP,CQ | Performed by: PHYSICAL THERAPY ASSISTANT

## 2022-10-19 PROCEDURE — 250N000013 HC RX MED GY IP 250 OP 250 PS 637: Performed by: PHYSICIAN ASSISTANT

## 2022-10-19 PROCEDURE — 97530 THERAPEUTIC ACTIVITIES: CPT | Mod: GP,CQ | Performed by: PHYSICAL THERAPY ASSISTANT

## 2022-10-19 PROCEDURE — 250N000011 HC RX IP 250 OP 636: Performed by: PHYSICIAN ASSISTANT

## 2022-10-19 PROCEDURE — 82947 ASSAY GLUCOSE BLOOD QUANT: CPT | Performed by: ORTHOPAEDIC SURGERY

## 2022-10-19 PROCEDURE — 97165 OT EVAL LOW COMPLEX 30 MIN: CPT | Mod: GO

## 2022-10-19 PROCEDURE — 97535 SELF CARE MNGMENT TRAINING: CPT | Mod: GO

## 2022-10-19 RX ORDER — ACETAMINOPHEN 325 MG/1
650 TABLET ORAL EVERY 4 HOURS PRN
Qty: 100 TABLET | Refills: 0 | Status: SHIPPED | OUTPATIENT
Start: 2022-10-19

## 2022-10-19 RX ORDER — AMOXICILLIN 250 MG
1-2 CAPSULE ORAL 2 TIMES DAILY
Qty: 100 TABLET | Refills: 0 | Status: SHIPPED | OUTPATIENT
Start: 2022-10-19

## 2022-10-19 RX ORDER — CELECOXIB 200 MG/1
200 CAPSULE ORAL DAILY
Qty: 30 CAPSULE | Refills: 0 | Status: SHIPPED | OUTPATIENT
Start: 2022-10-19 | End: 2022-11-18

## 2022-10-19 RX ORDER — OXYCODONE HYDROCHLORIDE 5 MG/1
5 TABLET ORAL EVERY 4 HOURS PRN
Qty: 33 TABLET | Refills: 0 | Status: SHIPPED | OUTPATIENT
Start: 2022-10-19

## 2022-10-19 RX ORDER — ASPIRIN 81 MG/1
162 TABLET ORAL DAILY
Qty: 120 TABLET | Refills: 0 | Status: SHIPPED | OUTPATIENT
Start: 2022-10-19

## 2022-10-19 RX ADMIN — PANTOPRAZOLE SODIUM 40 MG: 40 TABLET, DELAYED RELEASE ORAL at 06:42

## 2022-10-19 RX ADMIN — ACETAMINOPHEN 975 MG: 325 TABLET, FILM COATED ORAL at 10:26

## 2022-10-19 RX ADMIN — ACETAMINOPHEN 975 MG: 325 TABLET, FILM COATED ORAL at 02:49

## 2022-10-19 RX ADMIN — SERTRALINE HYDROCHLORIDE 50 MG: 50 TABLET ORAL at 08:15

## 2022-10-19 RX ADMIN — CEFAZOLIN 1 G: 1 INJECTION, POWDER, FOR SOLUTION INTRAMUSCULAR; INTRAVENOUS at 02:49

## 2022-10-19 RX ADMIN — ASPIRIN 162 MG: 81 TABLET, COATED ORAL at 08:15

## 2022-10-19 RX ADMIN — OXYCODONE HYDROCHLORIDE 5 MG: 5 TABLET ORAL at 10:26

## 2022-10-19 RX ADMIN — SENNOSIDES AND DOCUSATE SODIUM 1 TABLET: 50; 8.6 TABLET ORAL at 08:15

## 2022-10-19 RX ADMIN — GUAIFENESIN 600 MG: 600 TABLET ORAL at 08:15

## 2022-10-19 RX ADMIN — CELECOXIB 100 MG: 100 CAPSULE ORAL at 08:15

## 2022-10-19 ASSESSMENT — ACTIVITIES OF DAILY LIVING (ADL)
ADLS_ACUITY_SCORE: 20

## 2022-10-19 NOTE — PLAN OF CARE
A&OX4.  Up with 1 and walker.  Voiding in the bathroom.  Tolerating regular diet.  Oxycodone given X1 before discharge.  Discharge packet and medications reviewed and sent home with the patient.  Patient discharged home at 11am.

## 2022-10-19 NOTE — PROGRESS NOTES
"ORTHOPEDIC LOWER EXTREMITY PROGRESS NOTE    POD#1  Patient is a 67 year old female who underwent Procedure(s):  LEFT TOTAL KNEE ARTHROPLASTY on 10/18/2022. Pain is well controlled. Tolerating medication well, no nausea or vomiting.  No concerns, discharge instructions reviewed.    Vitals:   Blood pressure 135/70, pulse 76, temperature 97.7  F (36.5  C), temperature source Oral, resp. rate 16, height 1.562 m (5' 1.5\"), weight 65.8 kg (145 lb), last menstrual period 2009, SpO2 99 %, not currently breastfeeding.  Temp (24hrs), Av.6  F (36.4  C), Min:96.9  F (36.1  C), Max:97.9  F (36.6  C)      Drains: None    EXAM   The patient is awake and alert.  Calves are soft and non-tender.   Sensation is intact.  Dorsiflexion and plantar flexion is intact.  Foot warm with nl cap refill.  The incision is covered.     Labs:   Recent Labs   Lab Test 20  2244 20  0758 19  1756   HGB 13.9 14.2 13.7       ASSESSMENT  S/p L TKA   PLAN  1. DVT prophylaxis: aspirin  2. Weight Bearing: WBAT (Weight bearing as tolerated).  3. Anticipated discharge date today . Discharge to Home with Outpatient Treatment.  4. Cont Pain Control Oxycodone, Tylenol and Celebrex   5. Awaiting morning Hgb    Zhane Anaya PA-C  [facility]        "

## 2022-10-19 NOTE — PROGRESS NOTES
Patient vital signs are at baseline: Yes  Patient able to ambulate as they were prior to admission or with assist devices provided by therapies during their stay:  Yes  Patient MUST void prior to discharge:  Yes  Patient able to tolerate oral intake:  Yes  Pain has adequate pain control using Oral analgesics:  Yes  Does patient have an identified :  Yes  Has goal D/C date and time been discussed with patient:  Yes     Alert and oriented, CMS intact. Dressing -Ace wrap, CDI.

## 2022-10-19 NOTE — PROGRESS NOTES
10/19/22 0750   Appointment Info   Signing Clinician's Name / Credentials (OT) Rosemarie Rodriguez, OTR/L   Living Environment   People in Home spouse   Current Living Arrangements house   Home Accessibility stairs to enter home   Number of Stairs, Main Entrance 1   Stair Railings, Main Entrance none   Transportation Anticipated family or friend will provide   Living Environment Comments Walk-in shower, comfort-ht toilet seat, main floor living   Self-Care   Usual Activity Tolerance good   Current Activity Tolerance moderate   Equipment Currently Used at Home none  (Owns PUW which she plans on using at home)   Fall history within last six months no   Activity/Exercise/Self-Care Comment Baseline indep all ADL, IADL, mobility. Pt teaches pre-school half days 5d/wk.  Spouse avail 24/7 to assist at home as needed.   General Information   Onset of Illness/Injury or Date of Surgery 10/18/22   Referring Physician Kory Farooq PA-C   Patient/Family Therapy Goal Statement (OT) return home   Additional Occupational Profile Info/Pertinent History of Current Problem POD#1 L TKA   Existing Precautions/Restrictions fall   Left Lower Extremity (Weight-bearing Status) weight-bearing as tolerated (WBAT)   Cognitive Status Examination   Orientation Status orientation to person, place and time   Affect/Mental Status (Cognitive) WNL   Follows Commands WNL   Pain Assessment   Patient Currently in Pain   (8/10 L knee, pain meds given just before OT session)   Range of Motion Comprehensive   General Range of Motion no range of motion deficits identified   Strength Comprehensive (MMT)   General Manual Muscle Testing (MMT) Assessment no strength deficits identified   Coordination   Upper Extremity Coordination No deficits were identified   Bed Mobility   Comment (Bed Mobility) With bed flat  no rail modified indep   Transfers   Transfers toilet transfer;sit-stand transfer   Sit-Stand Transfer   Sit-Stand [place] (Transfers)  supervision   Assistive Device (Sit-Stand Transfers) walker, front-wheeled   Toilet Transfer   Type (Toilet Transfer) sit-stand;stand-sit   [place] Level (Toilet Transfer) supervision   Assistive Device (Toilet Transfer) walker, front-wheeled   Activities of Daily Living   BADL Assessment/Intervention upper body dressing;lower body dressing;toileting   Upper Body Dressing Assessment/Training   Position (Upper Body Dressing) edge of bed sitting   [place] Level (Upper Body Dressing) independent;set up   Lower Body Dressing Assessment/Training   Position (Lower Body Dressing) edge of bed sitting   [place] Level (Lower Body Dressing) supervision;set up   Toileting   [place] Level (Toileting) supervision   Clinical Impression   Criteria for Skilled Therapeutic Interventions Met (OT) Yes, treatment indicated   OT Diagnosis decline in ADL/IADL and mobility performance   Influenced by the following impairments post-op pain, decreased ROM and strength in LLE   OT Problem List-Impairments impacting ADL problems related to;activity tolerance impaired;balance;strength;pain;post-surgical precautions;range of motion (ROM)  (L knee)   Assessment of Occupational Performance 1-3 Performance Deficits   Identified Performance Deficits functional mobility/balance affecting ambulatory ADLs and transfers (dressing, toileting, HH mgmt)   Planned Therapy Interventions (OT) ADL retraining;IADL retraining;transfer training   Clinical Decision Making Complexity (OT) low complexity   Risk & Benefits of therapy have been explained evaluation/treatment results reviewed;care plan/treatment goals reviewed;risks/benefits reviewed;current/potential barriers reviewed;participants voiced agreement with care plan;participants included;patient   OT Total Evaluation Time   OT Eval, Low Complexity Minutes (66469) 15   OT Goals   Therapy Frequency (OT) One time eval and treatment   OT Predicted Duration/Target Date for Goal Attainment  10/19/22   OT Goals Lower Body Dressing;Upper Body Dressing;Toilet Transfer/Toileting;Transfers;OT Goal 1   OT: Upper Body Dressing including set-up/clothing retrieval;using adaptive equipment;Modified independent;Goal Met   OT: Lower Body Dressing Modified independent;using adaptive equipment;including set-up/clothing retrieval;Goal Met   OT: Transfer Modified independent;Goal Met  (walk-in shower transfer)   OT: Toilet Transfer/Toileting Modified independent;toilet transfer;cleaning and garment management;using adaptive equipment;Goal Met   OT: Goal 1 Patient will demonstrate/verbalize understanding of safe reaching/retrieval and management of household items to be able to manage safely in home setting.   Self-Care/Home Management   Self-Care/Home Mgmt/ADL, Compensatory, Meal Prep Minutes (94992) 25   Symptoms Noted During/After Treatment (Meal Preparation/Planning Training) increased pain  (with ambulatory portion of tasks)   Treatment Detail/Skilled Intervention OT: initially patient requiring cueing for safe sit<>stand/walker use and during toilet transfer used grab bar for support (which she does not have avail in home setting). Also, during LB dressing task assessment, used unsafe strategy when donning clothing and attempting to stand putting her at fall risk. Therefore, instruction provided in safe walker use to be incorporated with functional transfers and in retrieval of ADL/IADL items including safe positioning of walker, use of stable surface for support when reaching and strategies for moving/carrying supplies including clothing, meal prep items, etc. Following instrucction pt return demonstrated safe techniques without use of additional adaptive devices or equipment and with no further cueing. Instruction also provided in safe walk-in shower transfer with OT demonstrating simulated transfer based on pt's description of home set-up -- pt verbalized understanding of method -- will have spouse provide  supv initially to assure safety.   [place] Level (Upper Body Dressing Training) independent  (Including retrieval seaated EOB following instruction)   Lower Body Dressing Training Assistance independent  (Including retrieval seated EOB following instruction)   [place] Level (Toilet Training) independent  (Toileting/transfer following instruction using FWW only)   OT Discharge Planning   OT Discharge Recommendation (DC Rec) home with assist   OT Rationale for DC Rec Patient doing well, recommend spouse A with heavier household tasks.   OT Brief overview of current status Following training, pt demonstrated independence with full body dressing and toileting, indep with bed, toilet transfer and safe retrieval of ADL supplies.   Total Session Time   Timed Code Treatment Minutes 25   Total Session Time (sum of timed and untimed services) 40

## 2022-10-22 ENCOUNTER — HEALTH MAINTENANCE LETTER (OUTPATIENT)
Age: 67
End: 2022-10-22

## 2022-11-04 NOTE — PROGRESS NOTES
10/19/22 0750   Appointment Info   Signing Clinician's Name / Credentials (OT) Rosemarie Rodriguez, OTR/L   Living Environment   People in Home spouse   Current Living Arrangements house   Home Accessibility stairs to enter home   Number of Stairs, Main Entrance 1   Stair Railings, Main Entrance none   Transportation Anticipated family or friend will provide   Living Environment Comments Walk-in shower, comfort-ht toilet seat, main floor living   Self-Care   Usual Activity Tolerance good   Current Activity Tolerance moderate   Equipment Currently Used at Home none  (Owns PUW which she plans on using at home)   Fall history within last six months no   Activity/Exercise/Self-Care Comment Baseline indep all ADL, IADL, mobility. Pt teaches pre-school half days 5d/wk.  Spouse avail 24/7 to assist at home as needed.   General Information   Onset of Illness/Injury or Date of Surgery 10/18/22   Referring Physician Kory Farooq PA-C   Patient/Family Therapy Goal Statement (OT) return home   Additional Occupational Profile Info/Pertinent History of Current Problem POD#1 L TKA   Existing Precautions/Restrictions fall   Left Lower Extremity (Weight-bearing Status) weight-bearing as tolerated (WBAT)   Cognitive Status Examination   Orientation Status orientation to person, place and time   Affect/Mental Status (Cognitive) WNL   Follows Commands WNL   Pain Assessment   Patient Currently in Pain   (8/10 L knee, pain meds given just before OT session)   Range of Motion Comprehensive   General Range of Motion no range of motion deficits identified   Strength Comprehensive (MMT)   General Manual Muscle Testing (MMT) Assessment no strength deficits identified   Coordination   Upper Extremity Coordination No deficits were identified   Bed Mobility   Comment (Bed Mobility) With bed flat  no rail modified indep   Transfers   Transfers toilet transfer;sit-stand transfer   Sit-Stand Transfer   Sit-Stand [place] (Transfers)  supervision   Assistive Device (Sit-Stand Transfers) walker, front-wheeled   Toilet Transfer   Type (Toilet Transfer) sit-stand;stand-sit   [place] Level (Toilet Transfer) supervision   Assistive Device (Toilet Transfer) walker, front-wheeled   Activities of Daily Living   BADL Assessment/Intervention upper body dressing;lower body dressing;toileting   Upper Body Dressing Assessment/Training   Position (Upper Body Dressing) edge of bed sitting   [place] Level (Upper Body Dressing) independent;set up   Lower Body Dressing Assessment/Training   Position (Lower Body Dressing) edge of bed sitting   [place] Level (Lower Body Dressing) supervision;set up   Toileting   [place] Level (Toileting) supervision   Clinical Impression   Criteria for Skilled Therapeutic Interventions Met (OT) Yes, treatment indicated   OT Diagnosis decline in ADL/IADL and mobility performance   Influenced by the following impairments post-op pain, decreased ROM and strength in LLE   OT Problem List-Impairments impacting ADL problems related to;activity tolerance impaired;balance;strength;pain;post-surgical precautions;range of motion (ROM)  (L knee)   Assessment of Occupational Performance 1-3 Performance Deficits   Identified Performance Deficits functional mobility/balance affecting ambulatory ADLs and transfers (dressing, toileting, HH mgmt)   Planned Therapy Interventions (OT) ADL retraining;IADL retraining;transfer training   Clinical Decision Making Complexity (OT) low complexity   Risk & Benefits of therapy have been explained evaluation/treatment results reviewed;care plan/treatment goals reviewed;risks/benefits reviewed;current/potential barriers reviewed;participants voiced agreement with care plan;participants included;patient   OT Total Evaluation Time   OT Eval, Low Complexity Minutes (56572) 15   Therapy Certification   Start of Care Date 10/19/22   Certification date from 10/19/22   Certification date to 10/20/22    Medical Diagnosis L TKA   OT Goals   Therapy Frequency (OT) One time eval and treatment   OT Predicted Duration/Target Date for Goal Attainment 10/19/22   OT Goals Lower Body Dressing;Upper Body Dressing;Toilet Transfer/Toileting;Transfers;OT Goal 1   OT: Upper Body Dressing including set-up/clothing retrieval;using adaptive equipment;Modified independent;Goal Met   OT: Lower Body Dressing Modified independent;using adaptive equipment;including set-up/clothing retrieval;Goal Met   OT: Transfer Modified independent;Goal Met  (walk-in shower transfer)   OT: Toilet Transfer/Toileting Modified independent;toilet transfer;cleaning and garment management;using adaptive equipment;Goal Met   OT: Goal 1 Patient will demonstrate/verbalize understanding of safe reaching/retrieval and management of household items to be able to manage safely in home setting.   Self-Care/Home Management   Self-Care/Home Mgmt/ADL, Compensatory, Meal Prep Minutes (43572) 25   Symptoms Noted During/After Treatment (Meal Preparation/Planning Training) increased pain  (with ambulatory portion of tasks)   Treatment Detail/Skilled Intervention OT: initially patient requiring cueing for safe sit<>stand/walker use and during toilet transfer used grab bar for support (which she does not have avail in home setting). Also, during LB dressing task assessment, used unsafe strategy when donning clothing and attempting to stand putting her at fall risk. Therefore, instruction provided in safe walker use to be incorporated with functional transfers and in retrieval of ADL/IADL items including safe positioning of walker, use of stable surface for support when reaching and strategies for moving/carrying supplies including clothing, meal prep items, etc. Following instrucction pt return demonstrated safe techniques without use of additional adaptive devices or equipment and with no further cueing. Instruction also provided in safe walk-in shower transfer with OT  demonstrating simulated transfer based on pt's description of home set-up -- pt verbalized understanding of method -- will have spouse provide supv initially to assure safety.   [place] Level (Upper Body Dressing Training) independent  (Including retrieval seaated EOB following instruction)   Lower Body Dressing Training Assistance independent  (Including retrieval seated EOB following instruction)   [place] Level (Toilet Training) independent  (Toileting/transfer following instruction using FWW only)   OT Discharge Planning   OT Discharge Recommendation (DC Rec) home with assist   OT Rationale for DC Rec Patient doing well, recommend spouse A with heavier household tasks.   OT Brief overview of current status Following training, pt demonstrated independence with full body dressing and toileting, indep with bed, toilet transfer and safe retrieval of ADL supplies.   Total Session Time   Timed Code Treatment Minutes 25   Total Session Time (sum of timed and untimed services) 40

## 2023-06-18 ENCOUNTER — HEALTH MAINTENANCE LETTER (OUTPATIENT)
Age: 68
End: 2023-06-18

## 2023-06-26 ENCOUNTER — HOSPITAL ENCOUNTER (OUTPATIENT)
Dept: MAMMOGRAPHY | Facility: CLINIC | Age: 68
Discharge: HOME | End: 2023-06-26
Attending: OBSTETRICS & GYNECOLOGY | Admitting: OBSTETRICS & GYNECOLOGY
Payer: COMMERCIAL

## 2023-06-26 DIAGNOSIS — Z12.31 VISIT FOR SCREENING MAMMOGRAM: ICD-10-CM

## 2023-06-26 PROCEDURE — 77067 SCR MAMMO BI INCL CAD: CPT

## 2023-08-31 ENCOUNTER — OFFICE VISIT (OUTPATIENT)
Dept: CARDIOLOGY | Facility: CLINIC | Age: 68
End: 2023-08-31
Payer: COMMERCIAL

## 2023-08-31 VITALS
DIASTOLIC BLOOD PRESSURE: 88 MMHG | HEIGHT: 62 IN | SYSTOLIC BLOOD PRESSURE: 136 MMHG | WEIGHT: 147.6 LBS | HEART RATE: 98 BPM | BODY MASS INDEX: 27.16 KG/M2

## 2023-08-31 DIAGNOSIS — I47.10 SVT (SUPRAVENTRICULAR TACHYCARDIA) (H): Primary | ICD-10-CM

## 2023-08-31 PROCEDURE — 99203 OFFICE O/P NEW LOW 30 MIN: CPT | Performed by: INTERNAL MEDICINE

## 2023-08-31 PROCEDURE — 93000 ELECTROCARDIOGRAM COMPLETE: CPT | Performed by: INTERNAL MEDICINE

## 2023-08-31 NOTE — LETTER
8/31/2023    [place]  [address] [address]  [facility] [address]    RE: Marissa Kaur       Dear Colleague,     I had the pleasure of seeing Marissa Kaur in the [facility].  HPI and Plan:   It is a pleasure to see this lady for evaluation of palpitations.    I evaluated her excellent medical records.  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    She is physically active and has no symptoms of angina or heart failure.  She has no syncope or dizzy spells.    She does not smoke drink or abuse drugs.  Family history is negative for significant arrhythmias.    What she experiences is a thumping or fluttering sensation in her chest without any specific provoking factors aside from feeling anxious.  She also feels a whooshing sensation in her neck.  The symptoms would last seconds.    She does have a generalized anxiety disorder.    Her cardiac examination is normal.    Her EKG today shows sinus rhythm with incomplete right bundle branch block.  Comparison with previous EKGs showed no new changes.    I suspect she is experiencing some extra beats from anxiety.    I will request a Zio patch for further monitoring and if these show no significant arrhythmias she can be discharged from our clinic.        Orders Placed This Encounter   Procedures    EKG 12-lead complete w/read - Clinics (performed today)    Leadless EKG Monitor 8 to 14 Days       No orders of the defined types were placed in this encounter.      Encounter Diagnosis   Name Primary?    SVT (supraventricular tachycardia) (H) Yes       CURRENT MEDICATIONS:  Current Outpatient Medications   Medication Sig Dispense Refill    acetaminophen (TYLENOL) 325 MG tablet Take 2 tablets (650 mg) by mouth every 4 hours as needed for other (mild pain) 100 tablet 0    atorvastatin (LIPITOR) 20 MG tablet Take 20 mg by mouth daily      Cholecalciferol (VITAMIN D3) 25 MCG (1000 UT) CAPS Take 1 capsule by mouth  daily      omeprazole (PRILOSEC) 20 MG DR capsule Take 20 mg by mouth daily      sertraline (ZOLOFT) 50 MG tablet Take 100 mg by mouth daily      aspirin 81 MG EC tablet Take 2 tablets (162 mg) by mouth daily 120 tablet 0    celecoxib (CELEBREX) 200 MG capsule Take 1 capsule (200 mg) by mouth daily for 30 days Do not take within 6 hours of ibuprofen (MOTRIN, ADVIL) or ketorolac (TORADOL) if prescribed. 30 capsule 0    guaiFENesin (MUCINEX) 600 MG 12 hr tablet Take 600 mg by mouth 2 times daily      oxyCODONE (ROXICODONE) 5 MG tablet Take 1 tablet (5 mg) by mouth every 4 hours as needed for moderate to severe pain 33 tablet 0    senna-docusate (SENOKOT-S/PERICOLACE) 8.6-50 MG tablet Take 1-2 tablets by mouth 2 times daily Take while on oral narcotics to prevent or treat constipation. 100 tablet 0       ALLERGIES   No Known Allergies    PAST MEDICAL HISTORY:  Past Medical History:   Diagnosis Date    Adjustment disorder with depressed mood     Allergic rhinitis due to other allergen     seasonal    Arrhythmia     Arthritis     Depression     Fibroid uterus     CEM (generalized anxiety disorder)     Gastroesophageal reflux disease with esophagitis     Menorrhagia     Paroxysmal atrial fibrillation (H)     Stress incontinence, female     SVT (supraventricular tachycardia) (H)     AVNRT ablation 10/29/2010       PAST SURGICAL HISTORY:  Past Surgical History:   Procedure Laterality Date    ARTHROPLASTY KNEE Left 10/18/2022    Procedure: LEFT TOTAL KNEE ARTHROPLASTY;  Surgeon: Yovani Yarbrough MD;  Location: [facility]    COLONOSCOPY      EXCISE SOFT TISSUE TUMOR THIGH  2013    Procedure: EXCISE SOFT TISSUE TUMOR THIGH;  Removal Of Left distal Thigh Tumor;  Surgeon: Zan Lewis MD;  Location: [facility]    H ABLATION SVT  10/2010    AVNRT    KNEE SURGERY      TONSILLECTOMY      [facility]  DELIVERY ONLY      [facility] REPAIR CRUCIATE LIGAMENT,KNEE      left knee, by Dr. Loomis       FAMILY HISTORY:  Family  "History   Problem Relation Age of Onset    ROCHELLE Maternal Grandmother     ROCHELLE Maternal Grandfather     Hypertension Mother     Pancreatic Cancer Mother     Hypertension Father     Heart Disease Father     No Known Problems Paternal Grandmother     No Known Problems Paternal Grandfather     No Known Problems Brother     Breast Cancer Sister 40    No Known Problems Son     No Known Problems Daughter     No Known Problems Other        SOCIAL HISTORY:  Social History     Socioeconomic History    Marital status:      Spouse name: None    Number of children: 2    Years of education: None    Highest education level: None   Tobacco Use    Smoking status: Never    Smokeless tobacco: Never   Vaping Use    Vaping Use: Never used   Substance and Sexual Activity    Alcohol use: Not Currently    Drug use: No    Sexual activity: Yes     Partners: Male     Birth control/protection: Post-menopausal, Male Surgical     Comment: vasectomy   Other Topics Concern    Parent/sibling w/ CABG, MI or angioplasty before 65F 55M? No    Caffeine Concern Yes     Comment: decafe    Sleep Concern No    Stress Concern No    Exercise No       Review of Systems:  Skin:  Negative     Eyes:  Positive for glasses  ENT:  Negative    Respiratory:  Negative    Cardiovascular:  chest pain;edema;syncope or near-syncope;exercise intolerance;lightheadedness;dizziness;Negative for palpitations;Positive for;fatigue  Gastroenterology: Positive for reflux  Genitourinary:  Negative    Musculoskeletal:  Negative    Neurologic:  Negative    Psychiatric:  Negative anxiety  Heme/Lymph/Imm:  Negative    Endocrine:  Negative      Physical Exam:  Vitals: /88   Pulse 98   Ht 1.575 m (5' 2\")   Wt 67 kg (147 lb 9.6 oz)   LMP 11/02/2009   BMI 27.00 kg/m      Constitutional:  cooperative, alert and oriented, well developed, well nourished, in no acute distress        Skin:  warm and dry to the touch, no apparent skin lesions or masses noted          Head:  " normocephalic, no masses or lesions        Eyes:  conjunctivae and lids unremarkable        Lymph:No Cervical lymphadenopathy present     ENT:  no pallor or cyanosis, dentition good        Neck:  carotid pulses are full and equal bilaterally, JVP normal, no carotid bruit        Respiratory:  normal breath sounds, clear to auscultation, normal A-P diameter, normal symmetry, normal respiratory excursion, no use of accessory muscles         Cardiac: regular rhythm, normal S1/S2, no S3 or S4, apical impulse not displaced, no murmurs, gallops or rubs                pulses full and equal, no bruits auscultated                                        GI:  abdomen soft, non-tender, BS normoactive, no mass, no HSM, no bruits        Extremities and Muscular Skeletal:  no deformities, clubbing, cyanosis, erythema observed              Neurological:  no gross motor deficits        Psych:  Alert and Oriented x 3        Recent Lab Results:  LIPID RESULTS:  Lab Results   Component Value Date    CHOL 228 (H) 11/14/2013    HDL 71 11/14/2013     (H) 10/16/2014     (H) 11/14/2013    TRIG 62 11/14/2013    CHOLHDLRATIO 3.2 11/14/2013       LIVER ENZYME RESULTS:  Lab Results   Component Value Date    AST 30 06/08/2019    ALT 31 06/08/2019       CBC RESULTS:  Lab Results   Component Value Date    WBC 13.2 (H) 12/28/2020    RBC 4.80 12/28/2020    HGB 12.5 10/19/2022    HGB 13.9 12/28/2020    HCT 43.4 12/28/2020    MCV 90 12/28/2020    MCH 29.0 12/28/2020    MCHC 32.0 12/28/2020    RDW 13.4 12/28/2020     12/28/2020       BMP RESULTS:  Lab Results   Component Value Date     12/28/2020    POTASSIUM 3.9 12/28/2020    CHLORIDE 105 12/28/2020    CO2 25 12/28/2020    ANIONGAP 9 12/28/2020    GLC 79 10/19/2022     (H) 12/28/2020    BUN 17 12/28/2020    CR 0.81 12/28/2020    GFRESTIMATED 77 12/28/2020    GFRESTBLACK 89 12/28/2020    NASH 9.2 12/28/2020        A1C RESULTS:  Lab Results   Component Value Date    A1C  5.1 06/27/2011       INR RESULTS:  No results found for: INR        CC  Aishwarya Crowe MD  [address]  [place] [address]                     Thank you for allowing me to participate in the care of your patient.      Sincerely,     DR AISHWARYA CROWE MD     [facility]

## 2023-08-31 NOTE — PROGRESS NOTES
HPI and Plan:   It is a pleasure to see this lady for evaluation of palpitations.    I evaluated her excellent medical records.  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    She is physically active and has no symptoms of angina or heart failure.  She has no syncope or dizzy spells.    She does not smoke drink or abuse drugs.  Family history is negative for significant arrhythmias.    What she experiences is a thumping or fluttering sensation in her chest without any specific provoking factors aside from feeling anxious.  She also feels a whooshing sensation in her neck.  The symptoms would last seconds.    She does have a generalized anxiety disorder.    Her cardiac examination is normal.    Her EKG today shows sinus rhythm with incomplete right bundle branch block.  Comparison with previous EKGs showed no new changes.    I suspect she is experiencing some extra beats from anxiety.    I will request a Zio patch for further monitoring and if these show no significant arrhythmias she can be discharged from our clinic.        Orders Placed This Encounter   Procedures    EKG 12-lead complete w/read - Clinics (performed today)    Leadless EKG Monitor 8 to 14 Days       No orders of the defined types were placed in this encounter.      Encounter Diagnosis   Name Primary?    SVT (supraventricular tachycardia) (H) Yes       CURRENT MEDICATIONS:  Current Outpatient Medications   Medication Sig Dispense Refill    acetaminophen (TYLENOL) 325 MG tablet Take 2 tablets (650 mg) by mouth every 4 hours as needed for other (mild pain) 100 tablet 0    atorvastatin (LIPITOR) 20 MG tablet Take 20 mg by mouth daily      Cholecalciferol (VITAMIN D3) 25 MCG (1000 UT) CAPS Take 1 capsule by mouth daily      omeprazole (PRILOSEC) 20 MG DR capsule Take 20 mg by mouth daily      sertraline (ZOLOFT) 50 MG tablet Take 100 mg by mouth daily      aspirin 81 MG EC tablet Take 2 tablets (162 mg) by mouth daily 120  tablet 0    celecoxib (CELEBREX) 200 MG capsule Take 1 capsule (200 mg) by mouth daily for 30 days Do not take within 6 hours of ibuprofen (MOTRIN, ADVIL) or ketorolac (TORADOL) if prescribed. 30 capsule 0    guaiFENesin (MUCINEX) 600 MG 12 hr tablet Take 600 mg by mouth 2 times daily      oxyCODONE (ROXICODONE) 5 MG tablet Take 1 tablet (5 mg) by mouth every 4 hours as needed for moderate to severe pain 33 tablet 0    senna-docusate (SENOKOT-S/PERICOLACE) 8.6-50 MG tablet Take 1-2 tablets by mouth 2 times daily Take while on oral narcotics to prevent or treat constipation. 100 tablet 0       ALLERGIES   No Known Allergies    PAST MEDICAL HISTORY:  Past Medical History:   Diagnosis Date    Adjustment disorder with depressed mood     Allergic rhinitis due to other allergen     seasonal    Arrhythmia     Arthritis     Depression     Fibroid uterus     CEM (generalized anxiety disorder)     Gastroesophageal reflux disease with esophagitis     Menorrhagia     Paroxysmal atrial fibrillation (H)     Stress incontinence, female     SVT (supraventricular tachycardia) (H)     AVNRT ablation 10/29/2010       PAST SURGICAL HISTORY:  Past Surgical History:   Procedure Laterality Date    ARTHROPLASTY KNEE Left 10/18/2022    Procedure: LEFT TOTAL KNEE ARTHROPLASTY;  Surgeon: Yovani Yarbrough MD;  Location: [facility]    COLONOSCOPY      EXCISE SOFT TISSUE TUMOR THIGH  2013    Procedure: EXCISE SOFT TISSUE TUMOR THIGH;  Removal Of Left distal Thigh Tumor;  Surgeon: Zan Lewis MD;  [facility]    H ABLATION SVT  10/2010    AVNRT    KNEE SURGERY      TONSILLECTOMY      [facility]  DELIVERY ONLY      [facility] REPAIR CRUCIATE LIGAMENT,KNEE      left knee, by Dr. Loomis       FAMILY HISTORY:  Family History   Problem Relation Age of Onset    C.A.D. Maternal Grandmother     C.A.D. Maternal Grandfather     Hypertension Mother     Pancreatic Cancer Mother     Hypertension Father     Heart Disease Father     No  "Known Problems Paternal Grandmother     No Known Problems Paternal Grandfather     No Known Problems Brother     Breast Cancer Sister 40    No Known Problems Son     No Known Problems Daughter     No Known Problems Other        SOCIAL HISTORY:  Social History     Socioeconomic History    Marital status:      Spouse name: None    Number of children: 2    Years of education: None    Highest education level: None   Tobacco Use    Smoking status: Never    Smokeless tobacco: Never   Vaping Use    Vaping Use: Never used   Substance and Sexual Activity    Alcohol use: Not Currently    Drug use: No    Sexual activity: Yes     Partners: Male     Birth control/protection: Post-menopausal, Male Surgical     Comment: vasectomy   Other Topics Concern    Parent/sibling w/ CABG, MI or angioplasty before 65F 55M? No    Caffeine Concern Yes     Comment: decafe    Sleep Concern No    Stress Concern No    Exercise No       Review of Systems:  Skin:  Negative     Eyes:  Positive for glasses  ENT:  Negative    Respiratory:  Negative    Cardiovascular:  chest pain;edema;syncope or near-syncope;exercise intolerance;lightheadedness;dizziness;Negative for palpitations;Positive for;fatigue  Gastroenterology: Positive for reflux  Genitourinary:  Negative    Musculoskeletal:  Negative    Neurologic:  Negative    Psychiatric:  Negative anxiety  Heme/Lymph/Imm:  Negative    Endocrine:  Negative      Physical Exam:  Vitals: /88   Pulse 98   Ht 1.575 m (5' 2\")   Wt 67 kg (147 lb 9.6 oz)   LMP 11/02/2009   BMI 27.00 kg/m      Constitutional:  cooperative, alert and oriented, well developed, well nourished, in no acute distress        Skin:  warm and dry to the touch, no apparent skin lesions or masses noted          Head:  normocephalic, no masses or lesions        Eyes:  conjunctivae and lids unremarkable        Lymph:No Cervical lymphadenopathy present     ENT:  no pallor or cyanosis, dentition good        Neck:  carotid pulses " are full and equal bilaterally, JVP normal, no carotid bruit        Respiratory:  normal breath sounds, clear to auscultation, normal A-P diameter, normal symmetry, normal respiratory excursion, no use of accessory muscles         Cardiac: regular rhythm, normal S1/S2, no S3 or S4, apical impulse not displaced, no murmurs, gallops or rubs                pulses full and equal, no bruits auscultated                                        GI:  abdomen soft, non-tender, BS normoactive, no mass, no HSM, no bruits        Extremities and Muscular Skeletal:  no deformities, clubbing, cyanosis, erythema observed              Neurological:  no gross motor deficits        Psych:  Alert and Oriented x 3        Recent Lab Results:  LIPID RESULTS:  Lab Results   Component Value Date    CHOL 228 (H) 11/14/2013    HDL 71 11/14/2013     (H) 10/16/2014     (H) 11/14/2013    TRIG 62 11/14/2013    CHOLHDLRATIO 3.2 11/14/2013       LIVER ENZYME RESULTS:  Lab Results   Component Value Date    AST 30 06/08/2019    ALT 31 06/08/2019       CBC RESULTS:  Lab Results   Component Value Date    WBC 13.2 (H) 12/28/2020    RBC 4.80 12/28/2020    HGB 12.5 10/19/2022    HGB 13.9 12/28/2020    HCT 43.4 12/28/2020    MCV 90 12/28/2020    MCH 29.0 12/28/2020    MCHC 32.0 12/28/2020    RDW 13.4 12/28/2020     12/28/2020       BMP RESULTS:  Lab Results   Component Value Date     12/28/2020    POTASSIUM 3.9 12/28/2020    CHLORIDE 105 12/28/2020    CO2 25 12/28/2020    ANIONGAP 9 12/28/2020    GLC 79 10/19/2022     (H) 12/28/2020    BUN 17 12/28/2020    CR 0.81 12/28/2020    GFRESTIMATED 77 12/28/2020    GFRESTBLACK 89 12/28/2020    NASH 9.2 12/28/2020        A1C RESULTS:  Lab Results   Component Value Date    A1C 5.1 06/27/2011       INR RESULTS:  No results found for: INR        CC  Cole Crowe MD  [address]  [place] [id number]

## 2023-09-01 ENCOUNTER — HOSPITAL ENCOUNTER (OUTPATIENT)
Dept: CARDIOLOGY | Facility: CLINIC | Age: 68
Discharge: HOME | End: 2023-09-01
Attending: INTERNAL MEDICINE | Admitting: INTERNAL MEDICINE
Payer: COMMERCIAL

## 2023-09-01 DIAGNOSIS — I47.10 SVT (SUPRAVENTRICULAR TACHYCARDIA) (H): ICD-10-CM

## 2023-09-01 PROCEDURE — 93248 EXT ECG>7D<15D REV&INTERPJ: CPT | Performed by: INTERNAL MEDICINE

## 2023-09-01 PROCEDURE — 93246 EXT ECG>7D<15D RECORDING: CPT

## 2023-10-20 ENCOUNTER — TELEPHONE (OUTPATIENT)
Dept: CARDIOLOGY | Facility: CLINIC | Age: 68
End: 2023-10-20
Payer: COMMERCIAL

## 2023-10-20 NOTE — TELEPHONE ENCOUNTER
[facility]    Phone Message    May a detailed message be left on voicemail: yes     Reason for Call: Requesting Results     Name/type of test: Cardiac monitor   Date of test: 08/31/23  Was test done at a location other than [facility] (Please fill in the location if not [facility])?: No    Action Taken: Other: Cardiology    Travel Screening: Not Applicable    Thank you!  [facility]

## 2023-10-23 NOTE — TELEPHONE ENCOUNTER
"Team received message to call Marissa about her [employer] results.    In review of results, we do not have these yet.  It says \"In Process\".    Writer called to Marissa and had to leave a message.  I let her know that we have not received the results yet.  Informed her that Dr. Crowe is very thorough, and timely, and will review as soon as they arrive here.  We will then call her with the report.    Invited her to call us or send in a [email or web address] message with questions\concerns.    Liberty Self RN on 10/23/2023 at 11:21 AM      "

## 2023-10-24 NOTE — TELEPHONE ENCOUNTER
I called patient and left her a detailed message regarding her Ziopatch results that she called in about last week.    I told her that I obtained a copy that has not been interpreted but it does show 8 episodes of SVT-    She had an SVT ablation in 2010.    I gave her our nurse line number to call back. I also mentioned that once Dr. Crowe has reviewed it we will call her with his recommendations as she does not have a return visit.

## 2023-10-26 NOTE — TELEPHONE ENCOUNTER
Patient returned call today and I spent considerable time reviewing the uninterpreted Ziopatch that showed 8 episodes of PSVT/Atrial tachycardia with the longest episode lasting 10 bets and this occurring around midnight.    According to the  report, there were no patient triggered events though she did say she felt anxiety daily  during the 14 day monitoring period. Interestingly, when the monitor came off , the anxiety went away.    I gave her re assurance that SVT is not  a life threatening arrhythmia and that 8 episodes over a 14 day period would likely not result in her having to be on medication for this.    Having said this, I told her that we will wait for Dr. Crowe's impressions and we will call her back after we receive them

## 2023-11-05 ENCOUNTER — HEALTH MAINTENANCE LETTER (OUTPATIENT)
Age: 68
End: 2023-11-05

## 2023-11-28 NOTE — TELEPHONE ENCOUNTER
Amrita, Cole Riojas MD  P [facility] Heart [facility] 3  Pls let her know findings.  Benign.  No further cardiac follow up.  Thanks.      Left patient a detailed message that Dr. Crowe reviewed and feels that her arrhythmias are benign and that no further work up is needed.    I told to call us anytime if her situation should change

## 2024-07-18 ENCOUNTER — HOSPITAL ENCOUNTER (OUTPATIENT)
Dept: MAMMOGRAPHY | Facility: CLINIC | Age: 69
Discharge: HOME | End: 2024-07-18
Attending: OBSTETRICS & GYNECOLOGY | Admitting: OBSTETRICS & GYNECOLOGY
Payer: COMMERCIAL

## 2024-07-18 DIAGNOSIS — Z12.31 VISIT FOR SCREENING MAMMOGRAM: ICD-10-CM

## 2024-07-18 PROCEDURE — 77063 BREAST TOMOSYNTHESIS BI: CPT

## 2024-07-25 ENCOUNTER — HOSPITAL ENCOUNTER (OUTPATIENT)
Dept: ULTRASOUND IMAGING | Facility: CLINIC | Age: 69
Discharge: HOME | End: 2024-07-25
Attending: OBSTETRICS & GYNECOLOGY
Payer: COMMERCIAL

## 2024-07-25 DIAGNOSIS — R92.8 ABNORMAL MAMMOGRAM: ICD-10-CM

## 2024-07-25 PROCEDURE — 76642 ULTRASOUND BREAST LIMITED: CPT | Mod: LT

## 2024-12-22 ENCOUNTER — HEALTH MAINTENANCE LETTER (OUTPATIENT)
Age: 69
End: 2024-12-22

## (undated) DEVICE — BLADE SAW SAGITTAL STRK 21X90X1.27MM HD SYS 6 6221-127-090

## (undated) DEVICE — ESU GROUND PAD UNIVERSAL W/O CORD

## (undated) DEVICE — CAST PADDING 6" UNSTERILE 9046

## (undated) DEVICE — DRAPE IOBAN INCISE 23X17" 6650EZ

## (undated) DEVICE — CAST PADDING 6" STERILE 9046S

## (undated) DEVICE — SYR 30ML LL W/O NDL 302832

## (undated) DEVICE — WRAP EZY KNEE

## (undated) DEVICE — DRSG ADAPTIC 3X8" 6113

## (undated) DEVICE — SOLUTION WOUND CLEANSING 3/4OZ 10% PVP EA-L3011FB-50

## (undated) DEVICE — DRAPE ARTHROSCOPY 120X92" 9414

## (undated) DEVICE — SU STRATAFIX MONOCRYL 2-0 SPIRAL SH 20CM SXMP1B409

## (undated) DEVICE — SU PDO 1 STRATAFIX 36X36CM CTX TAPERPOINT SXPD2B405

## (undated) DEVICE — MANIFOLD NEPTUNE 4 PORT 700-20

## (undated) DEVICE — CAST PADDING 4" UNSTERILE 9044

## (undated) DEVICE — SYR 50ML LL W/O NDL 309653

## (undated) DEVICE — DRSG STERI STRIP 1/2X4" R1547

## (undated) DEVICE — DRAPE STERI U 1015

## (undated) DEVICE — HOOD FLYTE W/PEELAWAY 408-800-100

## (undated) DEVICE — BONE CLEANING TIP INTERPULSE  0210-010-000

## (undated) DEVICE — SOL WATER IRRIG 1000ML BOTTLE 2F7114

## (undated) DEVICE — SU ETHICON STRATAFIX SPR PS 3-0 30X30CM SXMP2B412

## (undated) DEVICE — BLADE SAW SAGITTAL STRK 18X90X1.27MM HD SYS 6 6118-127-090

## (undated) DEVICE — SOL NACL 0.9% IRRIG 3000ML BAG 2B7477

## (undated) DEVICE — SUCTION TIP YANKAUER W/O VENT K86

## (undated) DEVICE — BLADE CLIPPER 4412A

## (undated) DEVICE — SU STRATAFIX PDS PLUS 0 CT 45CM SXPP1A406

## (undated) DEVICE — PREP CHLORAPREP 26ML TINTED HI-LITE ORANGE 930815

## (undated) DEVICE — BONE CEMENT MIXEVAC III HI VAC KIT  0206-015-000

## (undated) DEVICE — PACK TOTAL KNEE SOP15TKFSD

## (undated) DEVICE — GLOVE BIOGEL PI MICRO INDICATOR UNDERGLOVE SZ 8.5 48985

## (undated) DEVICE — GLOVE BIOGEL PI MICRO INDICATOR UNDERGLOVE SZ 8.0 48980

## (undated) DEVICE — NDL SPINAL 18GA 3.5" 405184

## (undated) DEVICE — SUCTION IRR SYSTEM W/O TIP INTERPULSE HANDPIECE 0210-100-000

## (undated) DEVICE — LINEN TOWEL PACK X5 5464

## (undated) RX ORDER — ONDANSETRON 2 MG/ML
INJECTION INTRAMUSCULAR; INTRAVENOUS
Status: DISPENSED
Start: 2022-10-18

## (undated) RX ORDER — ACETAMINOPHEN 325 MG/1
TABLET ORAL
Status: DISPENSED
Start: 2022-10-18

## (undated) RX ORDER — CELECOXIB 200 MG/1
CAPSULE ORAL
Status: DISPENSED
Start: 2022-10-18

## (undated) RX ORDER — FENTANYL CITRATE 50 UG/ML
INJECTION, SOLUTION INTRAMUSCULAR; INTRAVENOUS
Status: DISPENSED
Start: 2022-10-18

## (undated) RX ORDER — TRANEXAMIC ACID 650 MG/1
TABLET ORAL
Status: DISPENSED
Start: 2022-10-18

## (undated) RX ORDER — PROPOFOL 10 MG/ML
INJECTION, EMULSION INTRAVENOUS
Status: DISPENSED
Start: 2022-10-18

## (undated) RX ORDER — DEXAMETHASONE SODIUM PHOSPHATE 4 MG/ML
INJECTION, SOLUTION INTRA-ARTICULAR; INTRALESIONAL; INTRAMUSCULAR; INTRAVENOUS; SOFT TISSUE
Status: DISPENSED
Start: 2022-10-18